# Patient Record
Sex: MALE | Race: WHITE | Employment: UNEMPLOYED | URBAN - METROPOLITAN AREA
[De-identification: names, ages, dates, MRNs, and addresses within clinical notes are randomized per-mention and may not be internally consistent; named-entity substitution may affect disease eponyms.]

---

## 2021-07-09 ENCOUNTER — APPOINTMENT (OUTPATIENT)
Dept: GENERAL RADIOLOGY | Age: 36
End: 2021-07-09

## 2021-07-09 LAB
ALBUMIN SERPL-MCNC: 4.4 G/DL (ref 3.5–5.2)
ALP BLD-CCNC: 40 U/L (ref 40–129)
ALT SERPL-CCNC: 17 U/L (ref 0–40)
ANION GAP SERPL CALCULATED.3IONS-SCNC: 9 MMOL/L (ref 7–16)
AST SERPL-CCNC: 20 U/L (ref 0–39)
BASOPHILS ABSOLUTE: 0.12 E9/L (ref 0–0.2)
BASOPHILS RELATIVE PERCENT: 1.3 % (ref 0–2)
BILIRUB SERPL-MCNC: 0.5 MG/DL (ref 0–1.2)
BUN BLDV-MCNC: 16 MG/DL (ref 6–20)
CALCIUM SERPL-MCNC: 8.9 MG/DL (ref 8.6–10.2)
CHLORIDE BLD-SCNC: 105 MMOL/L (ref 98–107)
CO2: 24 MMOL/L (ref 22–29)
CREAT SERPL-MCNC: 0.9 MG/DL (ref 0.7–1.2)
EOSINOPHILS ABSOLUTE: 0.22 E9/L (ref 0.05–0.5)
EOSINOPHILS RELATIVE PERCENT: 2.4 % (ref 0–6)
GFR AFRICAN AMERICAN: >60
GFR NON-AFRICAN AMERICAN: >60 ML/MIN/1.73
GLUCOSE BLD-MCNC: 107 MG/DL (ref 74–99)
HCT VFR BLD CALC: 46.1 % (ref 37–54)
HEMOGLOBIN: 15.1 G/DL (ref 12.5–16.5)
IMMATURE GRANULOCYTES #: 0.04 E9/L
IMMATURE GRANULOCYTES %: 0.4 % (ref 0–5)
LYMPHOCYTES ABSOLUTE: 2.14 E9/L (ref 1.5–4)
LYMPHOCYTES RELATIVE PERCENT: 23.4 % (ref 20–42)
MCH RBC QN AUTO: 28.8 PG (ref 26–35)
MCHC RBC AUTO-ENTMCNC: 32.8 % (ref 32–34.5)
MCV RBC AUTO: 87.8 FL (ref 80–99.9)
MONOCYTES ABSOLUTE: 0.98 E9/L (ref 0.1–0.95)
MONOCYTES RELATIVE PERCENT: 10.7 % (ref 2–12)
NEUTROPHILS ABSOLUTE: 5.63 E9/L (ref 1.8–7.3)
NEUTROPHILS RELATIVE PERCENT: 61.8 % (ref 43–80)
PDW BLD-RTO: 13.4 FL (ref 11.5–15)
PLATELET # BLD: 272 E9/L (ref 130–450)
PMV BLD AUTO: 10.8 FL (ref 7–12)
POTASSIUM SERPL-SCNC: 3.9 MMOL/L (ref 3.5–5)
RBC # BLD: 5.25 E12/L (ref 3.8–5.8)
SODIUM BLD-SCNC: 138 MMOL/L (ref 132–146)
TOTAL PROTEIN: 6.9 G/DL (ref 6.4–8.3)
TROPONIN, HIGH SENSITIVITY: <6 NG/L (ref 0–11)
WBC # BLD: 9.1 E9/L (ref 4.5–11.5)

## 2021-07-09 PROCEDURE — 80053 COMPREHEN METABOLIC PANEL: CPT

## 2021-07-09 PROCEDURE — 85025 COMPLETE CBC W/AUTO DIFF WBC: CPT

## 2021-07-09 PROCEDURE — 71045 X-RAY EXAM CHEST 1 VIEW: CPT

## 2021-07-09 PROCEDURE — 93005 ELECTROCARDIOGRAM TRACING: CPT | Performed by: NURSE PRACTITIONER

## 2021-07-09 PROCEDURE — 36415 COLL VENOUS BLD VENIPUNCTURE: CPT

## 2021-07-09 PROCEDURE — 84484 ASSAY OF TROPONIN QUANT: CPT

## 2021-07-09 ASSESSMENT — PAIN SCALES - GENERAL: PAINLEVEL_OUTOF10: 5

## 2021-07-10 ENCOUNTER — HOSPITAL ENCOUNTER (EMERGENCY)
Age: 36
Discharge: HOME | End: 2021-07-10
Attending: EMERGENCY MEDICINE
Payer: COMMERCIAL

## 2021-07-10 VITALS
TEMPERATURE: 98.7 F | OXYGEN SATURATION: 98 % | HEART RATE: 85 BPM | DIASTOLIC BLOOD PRESSURE: 78 MMHG | RESPIRATION RATE: 17 BRPM | SYSTOLIC BLOOD PRESSURE: 113 MMHG

## 2021-07-10 DIAGNOSIS — R07.9 CHEST PAIN, UNSPECIFIED TYPE: Primary | ICD-10-CM

## 2021-07-10 LAB
EKG ATRIAL RATE: 68 BPM
EKG P AXIS: 80 DEGREES
EKG P-R INTERVAL: 166 MS
EKG Q-T INTERVAL: 380 MS
EKG QRS DURATION: 78 MS
EKG QTC CALCULATION (BAZETT): 404 MS
EKG R AXIS: 93 DEGREES
EKG T AXIS: 62 DEGREES
EKG VENTRICULAR RATE: 68 BPM

## 2021-07-10 PROCEDURE — 93010 ELECTROCARDIOGRAM REPORT: CPT | Performed by: INTERNAL MEDICINE

## 2021-07-10 PROCEDURE — 6360000002 HC RX W HCPCS: Performed by: EMERGENCY MEDICINE

## 2021-07-10 PROCEDURE — 99283 EMERGENCY DEPT VISIT LOW MDM: CPT

## 2021-07-10 PROCEDURE — 96372 THER/PROPH/DIAG INJ SC/IM: CPT

## 2021-07-10 RX ORDER — KETOROLAC TROMETHAMINE 30 MG/ML
30 INJECTION, SOLUTION INTRAMUSCULAR; INTRAVENOUS ONCE
Status: COMPLETED | OUTPATIENT
Start: 2021-07-10 | End: 2021-07-10

## 2021-07-10 RX ADMIN — KETOROLAC TROMETHAMINE 30 MG: 30 INJECTION, SOLUTION INTRAMUSCULAR; INTRAVENOUS at 00:39

## 2021-07-10 NOTE — ED NOTES
Bed: 17A-17  Expected date:   Expected time:   Means of arrival:   Comments:  male     Harshil Duarte RN  07/10/21 0025

## 2021-07-10 NOTE — ED NOTES
FIRST PROVIDER CONTACT ASSESSMENT NOTE        Department of Emergency Medicine            ED  First Provider Note            7/9/21  9:50 PM EDT    Chief Complaint: Chest Pain (PT STATES HIS CHEST PAIN STARTING THIS MORNING. PT REPORTS LEFT ARM NUMBNESS)      History of Present Illness:    Monae Ceron is a 38 y.o. male who presents to the emergency department for Chest Pain (PT STATES HIS CHEST PAIN STARTING THIS MORNING. PT REPORTS LEFT ARM NUMBNESS)  Focused Screening Exam:  Constitutional:  Alert, appears stated age and is in no distress.     *ALLERGIES*     Benadryl [diphenhydramine hcl]     ED Triage Vitals   BP Temp Temp src Pulse Resp SpO2 Height Weight   07/09/21 2146 07/09/21 2135 -- 07/09/21 2135 07/09/21 2135 07/09/21 2135 -- --   119/70 98.7 °F (37.1 °C)  (!) 41 17 98 %          Initial Plan of Care:  Initiate Treatment-Testing, Proceed toTreatment Area When Bed Available for ED Attending/MLP to Continue Care    -----------------END OF FIRST PROVIDER CONTACT ASSESSMENT NOTE--------------  Electronically signed by NATANAEL Barr CNP   DD: 7/9/21         NATANAEL Barr CNP  07/09/21 7990

## 2021-07-10 NOTE — ED PROVIDER NOTES
Department of Emergency Medicine   ED  Provider Note  Admit Date/RoomTime: 7/10/2021 12:25 AM  [facility]          History of Present Illness:  7/10/21, Time: 12:32 AM EDT  Chief Complaint   Patient presents with    Chest Pain     PT STATES HIS CHEST PAIN STARTING THIS MORNING. PT REPORTS LEFT ARM NUMBNESS                Dylan Cowart is a 38 y.o. male presenting to the ED for chest pain. Patient has had left-sided chest pain intermittently throughout the morning. Nothing makes it better or worse, sharp sensation, does not rating her. He has not had this in the past.  Denies any cardiac risk factors. Denies any PE risk factors. Denies any fever, chills, nausea, vomiting, change in bowel or bladder, neck pain or stiffness, lethargy, back pain, or any other symptoms or complaints. Review of Systems:   Pertinent positives and negatives are stated within HPI, all other systems reviewed and are negative.        --------------------------------------------- PAST HISTORY ---------------------------------------------  Past Medical History:  has no past medical history on file. Past Surgical History:  has a past surgical history that includes hernia repair. Social History:  reports that he has been smoking cigarettes. He has been smoking about 2.00 packs per day. He has never used smokeless tobacco. He reports that he does not drink alcohol and does not use drugs. Family History: family history includes Asthma in his mother; Heart Disease in his father; Morna Look in his father. . Unless otherwise noted, family history is non contributory    The patients home medications have been reviewed.     Allergies: Benadryl [diphenhydramine hcl]        ---------------------------------------------------PHYSICAL EXAM--------------------------------------    Constitutional/General: Alert and oriented x3  Head: Normocephalic and atraumatic  Eyes: PERRL, EOMI, sclera non icteric  Mouth: Oropharynx clear, handling secretions, no trismus, no asymmetry of the posterior oropharynx or uvular edema  Neck: Supple, full ROM, no stridor, no meningeal signs  Respiratory: Lungs clear to auscultation bilaterally, no wheezes, rales, or rhonchi. Not in respiratory distress  Cardiovascular:  Regular rate. Regular rhythm. 2+ distal pulses. Equal extremity pulses. Chest: No chest wall tenderness  GI:  Abdomen Soft, Non tender, Non distended. No rebound, guarding, or rigidity. No pulsatile masses. Musculoskeletal: Moves all extremities x 4. Warm and well perfused, no clubbing, cyanosis, or edema. Capillary refill <3 seconds  Integument: skin warm and dry. No rashes. Neurologic: GCS 15, no focal deficits, symmetric strength 5/5 in the upper and lower extremities bilaterally  Psychiatric: Normal Affect          -------------------------------------------------- RESULTS -------------------------------------------------  I have personally reviewed all laboratory and imaging results for this patient. Results are listed below.      LABS: (Lab results interpreted by me)  Results for orders placed or performed during the hospital encounter of 07/10/21   CBC Auto Differential   Result Value Ref Range    WBC 9.1 4.5 - 11.5 E9/L    RBC 5.25 3.80 - 5.80 E12/L    Hemoglobin 15.1 12.5 - 16.5 g/dL    Hematocrit 46.1 37.0 - 54.0 %    MCV 87.8 80.0 - 99.9 fL    MCH 28.8 26.0 - 35.0 pg    MCHC 32.8 32.0 - 34.5 %    RDW 13.4 11.5 - 15.0 fL    Platelets 205 995 - 198 E9/L    MPV 10.8 7.0 - 12.0 fL    Neutrophils % 61.8 43.0 - 80.0 %    Immature Granulocytes % 0.4 0.0 - 5.0 %    Lymphocytes % 23.4 20.0 - 42.0 %    Monocytes % 10.7 2.0 - 12.0 %    Eosinophils % 2.4 0.0 - 6.0 %    Basophils % 1.3 0.0 - 2.0 %    Neutrophils Absolute 5.63 1.80 - 7.30 E9/L    Immature Granulocytes # 0.04 E9/L    Lymphocytes Absolute 2.14 1.50 - 4.00 E9/L    Monocytes Absolute 0.98 (H) 0.10 - 0.95 E9/L    Eosinophils Absolute 0.22 0.05 - 0.50 E9/L    Basophils Absolute 0. 12 0.00 - 0.20 E9/L   Comprehensive Metabolic Panel   Result Value Ref Range    Sodium 138 132 - 146 mmol/L    Potassium 3.9 3.5 - 5.0 mmol/L    Chloride 105 98 - 107 mmol/L    CO2 24 22 - 29 mmol/L    Anion Gap 9 7 - 16 mmol/L    Glucose 107 (H) 74 - 99 mg/dL    BUN 16 6 - 20 mg/dL    CREATININE 0.9 0.7 - 1.2 mg/dL    GFR Non-African American >60 >=60 mL/min/1.73    GFR African American >60     Calcium 8.9 8.6 - 10.2 mg/dL    Total Protein 6.9 6.4 - 8.3 g/dL    Albumin 4.4 3.5 - 5.2 g/dL    Total Bilirubin 0.5 0.0 - 1.2 mg/dL    Alkaline Phosphatase 40 40 - 129 U/L    ALT 17 0 - 40 U/L    AST 20 0 - 39 U/L   Troponin   Result Value Ref Range    Troponin, High Sensitivity <6 0 - 11 ng/L   ,       RADIOLOGY:  Interpreted by Radiologist unless otherwise specified  XR CHEST PORTABLE   Final Result   No acute cardiopulmonary process. EKG Interpretation  Interpreted by emergency department physician, Dr. Ze Suggs, rate 68, no STEMI        ------------------------- NURSING NOTES AND VITALS REVIEWED ---------------------------   The nursing notes within the ED encounter and vital signs as below have been reviewed by myself  /70   Pulse 83   Temp 98.7 °F (37.1 °C)   Resp 17   SpO2 98%     Oxygen Saturation Interpretation: Normal    The patients available past medical records and past encounters were reviewed. ------------------------------ ED COURSE/MEDICAL DECISION MAKING----------------------  Medications - No data to display        The cardiac monitor revealed sinus with a heart rate in the 80s as interpreted by me. The cardiac monitor was ordered secondary to the patient's chest pain and to monitor the patient for dysrhythmia. CPT 31269         Medical Decision Making:    Labs and imaging reviewed. Patient's heart score is 0. PERC score is 0. He has chest pain for greater than 6 hours with the a high sensitive troponin less than 6.   Given his low risk factors, did not feel that further emergent valuation was indicated. Patient be discharged instructed to follow-up with his PCP, he is educated on signs and symptoms that require emergent evaluation. Counseling: The emergency provider has spoken with the patient and discussed todays results, in addition to providing specific details for the plan of care and counseling regarding the diagnosis and prognosis. Questions are answered at this time and they are agreeable with the plan.       --------------------------------- IMPRESSION AND DISPOSITION ---------------------------------    IMPRESSION  1. Chest pain, unspecified type        DISPOSITION  Disposition: Discharge to home  Patient condition is stable        NOTE: This report was transcribed using voice recognition software.  Every effort was made to ensure accuracy; however, inadvertent computerized transcription errors may be present        Kathy Donis MD  07/10/21 2404

## 2021-08-04 ENCOUNTER — APPOINTMENT (OUTPATIENT)
Dept: GENERAL RADIOLOGY | Age: 36
End: 2021-08-04
Payer: COMMERCIAL

## 2021-08-04 ENCOUNTER — HOSPITAL ENCOUNTER (EMERGENCY)
Age: 36
Discharge: HOME | End: 2021-08-04
Payer: COMMERCIAL

## 2021-08-04 VITALS
BODY MASS INDEX: 25.9 KG/M2 | HEART RATE: 100 BPM | DIASTOLIC BLOOD PRESSURE: 89 MMHG | SYSTOLIC BLOOD PRESSURE: 117 MMHG | OXYGEN SATURATION: 96 % | RESPIRATION RATE: 15 BRPM | WEIGHT: 185 LBS | HEIGHT: 71 IN | TEMPERATURE: 97.5 F

## 2021-08-04 DIAGNOSIS — M79.641 PAIN IN BOTH HANDS: Primary | ICD-10-CM

## 2021-08-04 DIAGNOSIS — M79.642 PAIN IN BOTH HANDS: Primary | ICD-10-CM

## 2021-08-04 PROCEDURE — 99283 EMERGENCY DEPT VISIT LOW MDM: CPT

## 2021-08-04 PROCEDURE — 73130 X-RAY EXAM OF HAND: CPT

## 2021-08-04 PROCEDURE — 6360000002 HC RX W HCPCS: Performed by: PHYSICIAN ASSISTANT

## 2021-08-04 PROCEDURE — 96372 THER/PROPH/DIAG INJ SC/IM: CPT

## 2021-08-04 RX ORDER — NAPROXEN 500 MG/1
500 TABLET ORAL 2 TIMES DAILY WITH MEALS
Qty: 20 TABLET | Refills: 0 | Status: SHIPPED | OUTPATIENT
Start: 2021-08-04 | End: 2021-11-16

## 2021-08-04 RX ORDER — METHYLPREDNISOLONE 4 MG/1
TABLET ORAL
Qty: 1 KIT | Refills: 0 | Status: SHIPPED | OUTPATIENT
Start: 2021-08-04 | End: 2021-08-10

## 2021-08-04 RX ORDER — KETOROLAC TROMETHAMINE 30 MG/ML
30 INJECTION, SOLUTION INTRAMUSCULAR; INTRAVENOUS ONCE
Status: COMPLETED | OUTPATIENT
Start: 2021-08-04 | End: 2021-08-04

## 2021-08-04 RX ADMIN — KETOROLAC TROMETHAMINE 30 MG: 30 INJECTION, SOLUTION INTRAMUSCULAR; INTRAVENOUS at 11:30

## 2021-08-04 ASSESSMENT — PAIN DESCRIPTION - ORIENTATION: ORIENTATION: RIGHT;LEFT

## 2021-08-04 ASSESSMENT — PAIN SCALES - GENERAL
PAINLEVEL_OUTOF10: 9
PAINLEVEL_OUTOF10: 10

## 2021-08-04 ASSESSMENT — PAIN DESCRIPTION - PAIN TYPE: TYPE: ACUTE PAIN

## 2021-08-04 ASSESSMENT — PAIN DESCRIPTION - LOCATION: LOCATION: HAND

## 2021-08-04 NOTE — ED PROVIDER NOTES
[facility]    HPI:  8/4/21, Time: 11:23 AM EDT         Fanny Hunt is a 38 y.o. male presenting to the ED for bilateral hand pain, beginning years ago however, has been worse the past 3 months. Patient denies any particular injury but does relay a history of arthritis. The complaint has been persistent, moderate in severity, and worsened by movement of joints of bilateral hands. Patient has been afebrile without recent travel or sick contacts. Patient denies other symptoms at this time. Review of Systems:   A complete review of systems was performed and pertinent positives and negatives are stated within HPI, all other systems reviewed and are negative.          --------------------------------------------- PAST HISTORY ---------------------------------------------  Past Medical History:  has no past medical history on file. Past Surgical History:  has a past surgical history that includes hernia repair. Social History:  reports that he has been smoking cigarettes. He has been smoking about 2.00 packs per day. He has never used smokeless tobacco. He reports that he does not drink alcohol and does not use drugs. Family History: family history includes Asthma in his mother; Heart Disease in his father; Guthrie Rattler in his father. The patients home medications have been reviewed. Allergies: Benadryl [diphenhydramine hcl] and Other    -------------------------------------------------- RESULTS -------------------------------------------------  All laboratory and radiology results have been personally reviewed by myself   LABS:  No results found for this visit on 08/04/21. RADIOLOGY:  Interpreted by Radiologist.  XR HAND RIGHT (MIN 3 VIEWS)   Final Result   No acute fractures or dislocations in the right hand. XR HAND LEFT (MIN 3 VIEWS)   Final Result   Focal density at the base of 4th proximal phalanx may represent avulsion   injury versus remote injury.       No significant edema of left hand. Please correlate with physical exam.             ------------------------- NURSING NOTES AND VITALS REVIEWED ---------------------------   The nursing notes within the ED encounter and vital signs as below have been reviewed. /89   Pulse 100   Temp 97.5 °F (36.4 °C)   Resp 15   Ht 5' 11\" (1.803 m)   Wt 185 lb (83.9 kg)   SpO2 96%   BMI 25.80 kg/m²   Oxygen Saturation Interpretation: Normal      ---------------------------------------------------PHYSICAL EXAM--------------------------------------      Constitutional/General: Alert and oriented x3, well appearing, non toxic in NAD  Head: Normocephalic and atraumatic  Eyes: PERRL, EOMI  Mouth: Oropharynx clear, handling secretions, no trismus  Neck: Supple, full ROM,   Pulmonary: Lungs clear to auscultation bilaterally, no wheezes, rales, or rhonchi. Not in respiratory distress  Cardiovascular:  Regular rate and rhythm, no murmurs, gallops, or rubs. 2+ distal pulses  Abdomen: Soft, non tender, non distended,   Extremities: Moves all extremities x 4. Warm and well perfused  Skin: warm and dry without rash  Neurologic: GCS 15,  Psych: Normal Affect      ------------------------------ ED COURSE/MEDICAL DECISION MAKING----------------------  Medications   ketorolac (TORADOL) injection 30 mg (30 mg Intramuscular Given 8/4/21 1130)         ED COURSE:  ED Course as of Aug 04 1700   Wed Aug 04, 2021   1220 Reassessed patient. Remains stable and neurovascularly intact. Awaiting XR. [MS]   1311 Awaiting XR. Patient taken to radiology. [MS]   1326 Reassessed patient. Remained stable and neurovascularly intact. Discussed imaging results with the patient. No acute pathology noted on x-rays done in the emergency department today. He is nontoxic-appearing, afebrile, no acute distress. Vitals are stable. At this time recommended outpatient symptom management.   Advised follow-up with PCP for recheck and return to the emergency department with any new or worsening symptoms. Patient voiced understanding and is agreeable to the above treatment plan. [MS]      ED Course User Index  [MS] Prisca Grider       Medical Decision Making:    See ED course above. Counseling: The emergency provider has spoken with the patient and discussed todays results, in addition to providing specific details for the plan of care and counseling regarding the diagnosis and prognosis. Questions are answered at this time and they are agreeable with the plan.      --------------------------------- IMPRESSION AND DISPOSITION ---------------------------------    IMPRESSION  1. Pain in both hands        DISPOSITION  Disposition: Discharge to home  Patient condition is stable      NOTE: This report was transcribed using voice recognition software.  Every effort was made to ensure accuracy; however, inadvertent computerized transcription errors may be present        Prisca Grider  08/04/21 1776

## 2021-08-06 ENCOUNTER — HOSPITAL ENCOUNTER (EMERGENCY)
Age: 36
Discharge: LEFT BEFORE DISCHARGE | End: 2021-08-06
Payer: COMMERCIAL

## 2021-08-06 VITALS — OXYGEN SATURATION: 98 % | TEMPERATURE: 98 F | HEART RATE: 90 BPM

## 2021-08-15 ENCOUNTER — HOSPITAL ENCOUNTER (EMERGENCY)
Age: 36
Discharge: HOME | End: 2021-08-16
Attending: EMERGENCY MEDICINE
Payer: COMMERCIAL

## 2021-08-15 DIAGNOSIS — T50.901A ACCIDENTAL DRUG OVERDOSE, INITIAL ENCOUNTER: Primary | ICD-10-CM

## 2021-08-15 PROCEDURE — 93005 ELECTROCARDIOGRAM TRACING: CPT | Performed by: EMERGENCY MEDICINE

## 2021-08-15 PROCEDURE — 99284 EMERGENCY DEPT VISIT MOD MDM: CPT

## 2021-08-15 RX ORDER — ACETAMINOPHEN 500 MG
TABLET ORAL
Status: COMPLETED
Start: 2021-08-15 | End: 2021-08-16

## 2021-08-16 ENCOUNTER — APPOINTMENT (OUTPATIENT)
Dept: CT IMAGING | Age: 36
End: 2021-08-16
Payer: COMMERCIAL

## 2021-08-16 VITALS
OXYGEN SATURATION: 97 % | RESPIRATION RATE: 18 BRPM | TEMPERATURE: 96.9 F | HEIGHT: 72 IN | SYSTOLIC BLOOD PRESSURE: 129 MMHG | HEART RATE: 84 BPM | DIASTOLIC BLOOD PRESSURE: 83 MMHG | WEIGHT: 180 LBS | BODY MASS INDEX: 24.38 KG/M2

## 2021-08-16 LAB
EKG ATRIAL RATE: 85 BPM
EKG P AXIS: 77 DEGREES
EKG P-R INTERVAL: 174 MS
EKG Q-T INTERVAL: 362 MS
EKG QRS DURATION: 74 MS
EKG QTC CALCULATION (BAZETT): 430 MS
EKG R AXIS: 82 DEGREES
EKG T AXIS: 61 DEGREES
EKG VENTRICULAR RATE: 85 BPM

## 2021-08-16 PROCEDURE — 72125 CT NECK SPINE W/O DYE: CPT

## 2021-08-16 PROCEDURE — 6370000000 HC RX 637 (ALT 250 FOR IP)

## 2021-08-16 PROCEDURE — 70450 CT HEAD/BRAIN W/O DYE: CPT

## 2021-08-16 PROCEDURE — 93010 ELECTROCARDIOGRAM REPORT: CPT | Performed by: INTERNAL MEDICINE

## 2021-08-16 RX ORDER — ACETAMINOPHEN 500 MG
1000 TABLET ORAL ONCE
Status: COMPLETED | OUTPATIENT
Start: 2021-08-16 | End: 2021-08-16

## 2021-08-16 RX ADMIN — ACETAMINOPHEN 1000 MG: 500 TABLET ORAL at 00:00

## 2021-08-16 RX ADMIN — Medication 1000 MG: at 00:00

## 2021-08-16 ASSESSMENT — PAIN SCALES - GENERAL: PAINLEVEL_OUTOF10: 10

## 2021-08-16 NOTE — ED PROVIDER NOTES
Department of Emergency Medicine   ED  Provider Note  Admit Date/RoomTime: 8/15/2021 10:51 PM  ED Room: 10/10          History of Present Illness:  8/15/21, Time: 11:04 PM EDT  Chief Complaint   Patient presents with    Drug Overdose     Found laying on [facility] at [facility]. PD gave Narcan 4mg, pt became responsive later became combative with PD. Pt states \"I didn't take anything\"  Pink slip by [employer], denies SI or HI                Mauro Wylie is a 38 y.o. male presenting to the ED for possible drug overdose, beginning less than 1 hour ago. Patient is currently at a secure skilled nursing [place], reportedly was found unresponsive, given Narcan, then awoke and became aggressive. Patient was then brought to the ED for evaluation. Patient denies any drug use, states \"I was just sleeping and then everyone was in my room. \"  He is refusing to answer any questions, states that he does have a headache. Review of Systems:   Pertinent positives and negatives are stated within HPI, all other systems reviewed and are negative.        --------------------------------------------- PAST HISTORY ---------------------------------------------  Past Medical History:  has no past medical history on file. Past Surgical History:  has a past surgical history that includes hernia repair. Social History:  reports that he has been smoking cigarettes. He has been smoking about 2.00 packs per day. He has never used smokeless tobacco. He reports that he does not drink alcohol and does not use drugs. Family History: family history includes Asthma in his mother; Heart Disease in his father; Alda Trixie in his father. . Unless otherwise noted, family history is non contributory    The patients home medications have been reviewed.     Allergies: Benadryl [diphenhydramine hcl] and Other        ---------------------------------------------------PHYSICAL EXAM--------------------------------------    Constitutional/General: Alert and oriented x3  Head: Normocephalic, abrasion to the left brow  Eyes: PERRL, EOMI, sclera non icteric  Mouth: Oropharynx clear, handling secretions, no trismus, no asymmetry of the posterior oropharynx or uvular edema  Neck: Supple, full ROM, no stridor, no meningeal signs  Respiratory: Lungs clear to auscultation bilaterally, no wheezes, rales, or rhonchi. Not in respiratory distress  Cardiovascular:  Regular rate. Regular rhythm. No murmurs, no aortic murmurs, no gallops, or rubs. 2+ distal pulses. Equal extremity pulses. Chest: No chest wall tenderness  GI:  Abdomen Soft, Non tender, Non distended. No rebound, guarding, or rigidity. No pulsatile masses. Musculoskeletal: Moves all extremities x 4. Warm and well perfused, no clubbing, cyanosis, or edema. Capillary refill <3 seconds  Integument: skin warm and dry. No rashes. Neurologic: GCS 15, no focal deficits, symmetric strength 5/5 in the upper and lower extremities bilaterally  Psychiatric: Normal Affect          -------------------------------------------------- RESULTS -------------------------------------------------  I have personally reviewed all laboratory and imaging results for this patient. Results are listed below. LABS: (Lab results interpreted by me)  No results found for this visit on 08/15/21.,       RADIOLOGY:  Interpreted by Radiologist unless otherwise specified  CT Head WO Contrast   Final Result   Small left frontal scalp hematoma with no associated fractures or evidence of   acute intracranial trauma. CT Cervical Spine WO Contrast   Final Result   No acute abnormality of the cervical spine.                EKG Interpretation  Interpreted by emergency department physician, Dr. Maribel Rose    Date of EK/15/21  Time:     Rhythm: normal sinus   Rate: 85  Axis: normal  Conduction: normal  ST Segments: no acute change  T Waves: no acute change    Clinical Impression: No findings suggestive of acute ischemia or injury  Comparison to prior EKG: stable as compared to patient's most recent EKG      ------------------------- NURSING NOTES AND VITALS REVIEWED ---------------------------   The nursing notes within the ED encounter and vital signs as below have been reviewed by myself  /83   Pulse 87   Temp 96.9 °F (36.1 °C) (Temporal)   Resp 16   Ht 6' (1.829 m)   Wt 180 lb (81.6 kg)   SpO2 96%   BMI 24.41 kg/m²     Oxygen Saturation Interpretation: Normal    The patients available past medical records and past encounters were reviewed. ------------------------------ ED COURSE/MEDICAL DECISION MAKING----------------------  Medications   acetaminophen (TYLENOL) tablet 1,000 mg (1,000 mg Oral Given 8/16/21 0000)           The cardiac monitor revealed sinus rhythm with a heart rate in the 80s as interpreted by me. The cardiac monitor was ordered secondary to the patient's chest pain and to monitor for patient for dysrhythmia. CPT 70943           Medical Decision Making:     I, Dr. Mary Enriquez, am the primary provider of record    49-year-old male residing in a secure [facility] presenting with possible drug overdose. He was given Narcan with return of consciousness. Patient became aggressive and was handcuffed. Pink slip was initiated by ÁNGELA HAYWARD. Here he is more cooperative but still refusing to answer any questions. He has an abrasion to his left brow. EKG shows no suggestion of ischemia or injury, no conduction delay, unchanged from previous. CT head and cervical spine show no acute abnormality. Patient is awake and alert, staff of the residential house is at bedside, will be discharged and brought back to the secure [facility]. PCP follow-up as needed with instruction to return for any changes in condition or new symptoms. Re-Evaluations:   This patient's ED course included: a personal history and physicial examination and re-evaluation prior to disposition    This patient has remained hemodynamically stable and been closely monitored during their ED course. Counseling: The emergency provider has spoken with the patient and assisted house staff and discussed todays results, in addition to providing specific details for the plan of care and counseling regarding the diagnosis and prognosis. Questions are answered at this time and they are agreeable with the plan.       --------------------------------- IMPRESSION AND DISPOSITION ---------------------------------    IMPRESSION  1. Accidental drug overdose, initial encounter        DISPOSITION  Disposition: Discharge to [facility]  Patient condition is stable        NOTE: This report was transcribed using voice recognition software.  Every effort was made to ensure accuracy; however, inadvertent computerized transcription errors may be present        Neeraj Dodge DO  08/16/21 0104

## 2021-11-16 ENCOUNTER — HOSPITAL ENCOUNTER (EMERGENCY)
Age: 36
Discharge: LEFT BEFORE DISCHARGE | End: 2021-11-16
Payer: COMMERCIAL

## 2021-11-16 ENCOUNTER — NURSE TRIAGE (OUTPATIENT)
Dept: OTHER | Facility: CLINIC | Age: 36
End: 2021-11-16

## 2021-11-16 VITALS
SYSTOLIC BLOOD PRESSURE: 114 MMHG | TEMPERATURE: 98.2 F | DIASTOLIC BLOOD PRESSURE: 88 MMHG | BODY MASS INDEX: 22.4 KG/M2 | RESPIRATION RATE: 16 BRPM | WEIGHT: 160 LBS | HEIGHT: 71 IN | OXYGEN SATURATION: 100 % | HEART RATE: 96 BPM

## 2021-11-16 LAB
ALBUMIN SERPL-MCNC: 4.9 G/DL (ref 3.5–5.2)
ALP BLD-CCNC: 56 U/L (ref 40–129)
ALT SERPL-CCNC: 34 U/L (ref 0–40)
ANION GAP SERPL CALCULATED.3IONS-SCNC: 11 MMOL/L (ref 7–16)
AST SERPL-CCNC: 18 U/L (ref 0–39)
BACTERIA: NORMAL /HPF
BASOPHILS ABSOLUTE: 0.15 E9/L (ref 0–0.2)
BASOPHILS RELATIVE PERCENT: 2 % (ref 0–2)
BILIRUB SERPL-MCNC: 0.7 MG/DL (ref 0–1.2)
BILIRUBIN URINE: NEGATIVE
BLOOD, URINE: NEGATIVE
BUN BLDV-MCNC: 15 MG/DL (ref 6–20)
CALCIUM SERPL-MCNC: 10.1 MG/DL (ref 8.6–10.2)
CHLORIDE BLD-SCNC: 106 MMOL/L (ref 98–107)
CLARITY: CLEAR
CO2: 25 MMOL/L (ref 22–29)
COLOR: YELLOW
CREAT SERPL-MCNC: 1 MG/DL (ref 0.7–1.2)
EOSINOPHILS ABSOLUTE: 0.34 E9/L (ref 0.05–0.5)
EOSINOPHILS RELATIVE PERCENT: 4.5 % (ref 0–6)
GFR AFRICAN AMERICAN: >60
GFR NON-AFRICAN AMERICAN: >60 ML/MIN/1.73
GLUCOSE BLD-MCNC: 107 MG/DL (ref 74–99)
GLUCOSE URINE: NEGATIVE MG/DL
HCT VFR BLD CALC: 50.7 % (ref 37–54)
HEMOGLOBIN: 16.9 G/DL (ref 12.5–16.5)
IMMATURE GRANULOCYTES #: 0.04 E9/L
IMMATURE GRANULOCYTES %: 0.5 % (ref 0–5)
KETONES, URINE: NEGATIVE MG/DL
LACTIC ACID: 0.9 MMOL/L (ref 0.5–2.2)
LEUKOCYTE ESTERASE, URINE: NEGATIVE
LIPASE: 60 U/L (ref 13–60)
LYMPHOCYTES ABSOLUTE: 2.21 E9/L (ref 1.5–4)
LYMPHOCYTES RELATIVE PERCENT: 29.5 % (ref 20–42)
MCH RBC QN AUTO: 28.9 PG (ref 26–35)
MCHC RBC AUTO-ENTMCNC: 33.3 % (ref 32–34.5)
MCV RBC AUTO: 86.7 FL (ref 80–99.9)
MONOCYTES ABSOLUTE: 0.65 E9/L (ref 0.1–0.95)
MONOCYTES RELATIVE PERCENT: 8.7 % (ref 2–12)
NEUTROPHILS ABSOLUTE: 4.1 E9/L (ref 1.8–7.3)
NEUTROPHILS RELATIVE PERCENT: 54.8 % (ref 43–80)
NITRITE, URINE: NEGATIVE
PDW BLD-RTO: 13 FL (ref 11.5–15)
PH UA: 5.5 (ref 5–9)
PLATELET # BLD: 290 E9/L (ref 130–450)
PMV BLD AUTO: 10.9 FL (ref 7–12)
POTASSIUM SERPL-SCNC: 5 MMOL/L (ref 3.5–5)
PROTEIN UA: NEGATIVE MG/DL
RBC # BLD: 5.85 E12/L (ref 3.8–5.8)
RBC UA: NORMAL /HPF (ref 0–2)
SODIUM BLD-SCNC: 142 MMOL/L (ref 132–146)
SPECIFIC GRAVITY UA: 1.02 (ref 1–1.03)
TOTAL PROTEIN: 8.1 G/DL (ref 6.4–8.3)
UROBILINOGEN, URINE: 0.2 E.U./DL
WBC # BLD: 7.5 E9/L (ref 4.5–11.5)
WBC UA: NORMAL /HPF (ref 0–5)

## 2021-11-16 PROCEDURE — 83690 ASSAY OF LIPASE: CPT

## 2021-11-16 PROCEDURE — 83605 ASSAY OF LACTIC ACID: CPT

## 2021-11-16 PROCEDURE — 80053 COMPREHEN METABOLIC PANEL: CPT

## 2021-11-16 PROCEDURE — 81001 URINALYSIS AUTO W/SCOPE: CPT

## 2021-11-16 PROCEDURE — 85025 COMPLETE CBC W/AUTO DIFF WBC: CPT

## 2021-11-16 PROCEDURE — 4500000002 HC ER NO CHARGE

## 2021-11-16 RX ORDER — OMEPRAZOLE 20 MG/1
20 CAPSULE, DELAYED RELEASE ORAL DAILY
COMMUNITY
End: 2021-11-18 | Stop reason: ALTCHOICE

## 2021-11-16 ASSESSMENT — PAIN DESCRIPTION - LOCATION: LOCATION: ABDOMEN

## 2021-11-16 ASSESSMENT — PAIN SCALES - GENERAL: PAINLEVEL_OUTOF10: 10

## 2021-11-16 NOTE — ED NOTES
Patient left without being seen said he has waited too long patient eloped     Roseanna Fraga, RN  11/16/21 8408

## 2021-11-16 NOTE — ED NOTES
FIRST PROVIDER CONTACT ASSESSMENT NOTE           Department of Emergency Medicine                 First Provider Note            21  11:23 AM EST    Date of Encounter: No admission date for patient encounter. Patient Name: Kwabena Hathaway  : 1985  MRN: [id number]    Chief Complaint: No chief complaint on file. History of Present Illness:   Kwabean Hathaway is a 43 y.o. male who presents to the ED for. Patient states he was recently treated for an ulcer and was incarcerated and sent home without any antibiotics and is in very uncomfortable pain. States he is having some diarrhea. Patient has a history of H. pylori with a gastric perforation. Patient is supposed to see Tj Rodgers shortly but has not been able to get in yet. Focused Physical Exam:  VS:    ED Triage Vitals [21 1057]   BP Temp Temp src Pulse Resp SpO2 Height Weight   -- 97.4 °F (36.3 °C) -- 110 -- 97 % 5' 11\" (1.803 m) 160 lb (72.6 kg)        Physical Ex: Constitutional: Alert and non-toxic. Medical History:  has no past medical history on file. Surgical History:  has a past surgical history that includes hernia repair. Social History:  reports that he has been smoking cigarettes. He has been smoking about 2.00 packs per day. He has never used smokeless tobacco. He reports that he does not drink alcohol and does not use drugs. Family History: family history includes Asthma in his mother; Heart Disease in his father; Marva Black in his father.     Allergies: Benadryl [diphenhydramine hcl] and Other     Initial Plan of Care: Initiate Treatment-Testing, Proceed toTreatment Area When Bed Available for ED Attending/MLP to Continue Care      ---END OF FIRST PROVIDER CONTACT ASSESSMENT NOTE---  Electronically signed by Thee Camacho PA-C   DD: 21       Thee Camacho PA-C  21 1123

## 2021-11-16 NOTE — TELEPHONE ENCOUNTER
Caller disconnected with [facility] prior to being transferred to Nurse Triage. Triage RN attempted to leave a voicemail but the caller's voicemail box is not set up.     Reason for Disposition   Unable to complete triage due to phone connection issues    Protocols used: NO CONTACT OR DUPLICATE CONTACT CALL-ADULT-[place]

## 2021-11-18 ENCOUNTER — HOSPITAL ENCOUNTER (EMERGENCY)
Age: 36
Discharge: HOME | End: 2021-11-18
Attending: FAMILY MEDICINE
Payer: COMMERCIAL

## 2021-11-18 VITALS
BODY MASS INDEX: 22.4 KG/M2 | HEART RATE: 94 BPM | TEMPERATURE: 97.9 F | DIASTOLIC BLOOD PRESSURE: 69 MMHG | OXYGEN SATURATION: 99 % | HEIGHT: 71 IN | WEIGHT: 160 LBS | RESPIRATION RATE: 18 BRPM | SYSTOLIC BLOOD PRESSURE: 121 MMHG

## 2021-11-18 DIAGNOSIS — R10.13 ABDOMINAL PAIN, EPIGASTRIC: Primary | ICD-10-CM

## 2021-11-18 PROCEDURE — 99283 EMERGENCY DEPT VISIT LOW MDM: CPT

## 2021-11-18 PROCEDURE — 6370000000 HC RX 637 (ALT 250 FOR IP): Performed by: FAMILY MEDICINE

## 2021-11-18 RX ORDER — SUCRALFATE 1 G/1
1 TABLET ORAL 4 TIMES DAILY
Qty: 120 TABLET | Refills: 0 | Status: SHIPPED | OUTPATIENT
Start: 2021-11-18

## 2021-11-18 RX ORDER — PANTOPRAZOLE SODIUM 40 MG/1
40 TABLET, DELAYED RELEASE ORAL ONCE
Status: COMPLETED | OUTPATIENT
Start: 2021-11-18 | End: 2021-11-18

## 2021-11-18 RX ORDER — OMEPRAZOLE 40 MG/1
40 CAPSULE, DELAYED RELEASE ORAL
Qty: 30 CAPSULE | Refills: 0 | Status: SHIPPED | OUTPATIENT
Start: 2021-11-18

## 2021-11-18 RX ADMIN — PANTOPRAZOLE SODIUM 40 MG: 40 TABLET, DELAYED RELEASE ORAL at 10:46

## 2021-11-18 ASSESSMENT — PAIN DESCRIPTION - PAIN TYPE: TYPE: ACUTE PAIN

## 2021-11-18 ASSESSMENT — PAIN DESCRIPTION - LOCATION: LOCATION: ABDOMEN

## 2021-11-18 ASSESSMENT — PAIN DESCRIPTION - ORIENTATION: ORIENTATION: MID;UPPER

## 2021-11-18 ASSESSMENT — PAIN DESCRIPTION - DESCRIPTORS: DESCRIPTORS: ACHING

## 2021-11-18 ASSESSMENT — PAIN SCALES - GENERAL: PAINLEVEL_OUTOF10: 7

## 2021-11-18 NOTE — ED NOTES
INITIAL CONTACT WITH PT   ALERT AND ORIENTED TIMES 4. SKIN WARM AND DRY. RESPIRATIONS EVEN AND UNLABORED. LS CLEAR. GRASPS AND PUMPS EQUAL AND STRONG. STATES THAT HE NEEDS MEDICATION SCRIPTS TO GET HIM BY TILL HE CAN GET INTO THE DR. STATES THAT HE WENT TO THE CLINIC AS SCHEDULED BUT THE WRONG CLINIC STATES THAT HE TOOK THE BUS TO [facility]. STATES THAT HE HAS BEEN WORKING ON GETTING HIS MEDICAL CARD AND STATES THAT HE JUST NEEDS SCRIPTS. NO DISTRRESS NOTED.         Marcus Ruvalcaba RN  11/18/21 5168

## 2021-11-18 NOTE — ED PROVIDER NOTES
HPI:  11/18/21,   Time: 10:36 AM EULALIA Harper is a 43 y.o. male presenting to the ED for epigastric pain patient released from confinement during which time he was treated for H pylori as well as surgery for perforation gastric ulcer and pneumoperitoneum denies any nausea vomiting or diarrhea no chest pain no shortness of breath is unable to obtain his follow-up medications due to finances. .  The complaint has been persistent, moderate in severity, and worsened by nothing. ROS:   Pertinent positives and negatives are stated within HPI, all other systems reviewed and are negative.  --------------------------------------------- PAST HISTORY ---------------------------------------------  Past Medical History:  has a past medical history of Acute gastric ulcer with perforation ([facility] [address] Dermatophytosis, Helicobacter pylori infection, Plantar fasciitis, and Pneumoperitoneum. Past Surgical History:  has a past surgical history that includes hernia repair and Abdomen surgery. Social History:  reports that he has been smoking cigarettes. He has been smoking about 0.00 packs per day. He has never used smokeless tobacco. He reports that he does not drink alcohol and does not use drugs. Family History: family history includes Asthma in his mother; Heart Disease in his father; Adebayo Dye in his father. The patients home medications have been reviewed. Allergies: Benadryl [diphenhydramine hcl] and Other    -------------------------------------------------- RESULTS -------------------------------------------------  All laboratory and radiology results have been personally reviewed by myself   LABS:  No results found for this visit on 11/18/21. RADIOLOGY:  Interpreted by Radiologist.  No orders to display       ------------------------- NURSING NOTES AND VITALS REVIEWED ---------------------------   The nursing notes within the ED encounter and vital signs as below have been reviewed.    BP 121/69   Pulse 94   Temp 97.9 °F (36.6 °C) (Oral)   Resp 18   Ht 5' 11\" (1.803 m)   Wt 160 lb (72.6 kg)   SpO2 99%   BMI 22.32 kg/m²   Oxygen Saturation Interpretation: Normal      ---------------------------------------------------PHYSICAL EXAM--------------------------------------      Constitutional/General: Alert and oriented x3, well appearing, non toxic in NAD  Head: NC/AT  Eyes: PERRL, EOMI  Mouth: Oropharynx clear, handling secretions, no trismus  Neck: Supple, full ROM, no meningeal signs  Pulmonary: Lungs clear to auscultation bilaterally, no wheezes, rales, or rhonchi. Not in respiratory distress  Cardiovascular:  Regular rate and rhythm, no murmurs, gallops, or rubs. 2+ distal pulses  Abdomen: Soft, non tender, non distended, there is no guarding or rebound surgical scar noted midline healing well slightly hypertrophic otherwise. Extremities: Moves all extremities x 4. Warm and well perfused  Skin: warm and dry without rash  Neurologic: GCS 15,  Psych: Normal Affect      ------------------------------ ED COURSE/MEDICAL DECISION MAKING----------------------  Medications   aluminum & magnesium hydroxide-simethicone (MAALOX) 30 mL, lidocaine viscous hcl (XYLOCAINE) 5 mL (GI COCKTAIL) ( Oral Not Given 11/18/21 1046)   pantoprazole (PROTONIX) tablet 40 mg (40 mg Oral Given 11/18/21 1046)         Medical Decision Making:    Patient is nontoxic not tachypneic not hypoxic abdomen is soft nontender is no guarding or rebound medications will be refilled for patient advised follow-up in general medical clinic or [facility] return as needed    Counseling: The emergency provider has spoken with the patient and discussed todays results, in addition to providing specific details for the plan of care and counseling regarding the diagnosis and prognosis.   Questions are answered at this time and they are agreeable with the plan.      --------------------------------- IMPRESSION AND DISPOSITION ---------------------------------    IMPRESSION  1.  Abdominal pain, epigastric        DISPOSITION  Disposition: Discharge to home  Patient condition is good                  Debora Rasheed MD  11/18/21 7363

## 2021-12-09 ENCOUNTER — APPOINTMENT (OUTPATIENT)
Dept: GENERAL RADIOLOGY | Age: 36
End: 2021-12-09
Payer: MEDICAID

## 2021-12-09 ENCOUNTER — HOSPITAL ENCOUNTER (EMERGENCY)
Age: 36
Discharge: LEFT BEFORE DISCHARGE | End: 2021-12-09
Attending: STUDENT IN AN ORGANIZED HEALTH CARE EDUCATION/TRAINING PROGRAM
Payer: MEDICAID

## 2021-12-09 VITALS
TEMPERATURE: 97.8 F | WEIGHT: 170 LBS | OXYGEN SATURATION: 94 % | BODY MASS INDEX: 23.71 KG/M2 | DIASTOLIC BLOOD PRESSURE: 72 MMHG | SYSTOLIC BLOOD PRESSURE: 108 MMHG | RESPIRATION RATE: 16 BRPM | HEART RATE: 89 BPM

## 2021-12-09 DIAGNOSIS — R10.84 GENERALIZED ABDOMINAL PAIN: ICD-10-CM

## 2021-12-09 DIAGNOSIS — R55 SYNCOPE AND COLLAPSE: Primary | ICD-10-CM

## 2021-12-09 LAB
ALBUMIN SERPL-MCNC: 4.2 G/DL (ref 3.5–5.2)
ALP BLD-CCNC: 63 U/L (ref 40–129)
ALT SERPL-CCNC: 31 U/L (ref 0–40)
AMYLASE: 66 U/L (ref 20–100)
ANION GAP SERPL CALCULATED.3IONS-SCNC: 12 MMOL/L (ref 7–16)
APTT: 32 SEC (ref 24.5–35.1)
AST SERPL-CCNC: 20 U/L (ref 0–39)
BILIRUB SERPL-MCNC: <0.2 MG/DL (ref 0–1.2)
BUN BLDV-MCNC: 15 MG/DL (ref 6–20)
CALCIUM SERPL-MCNC: 9.5 MG/DL (ref 8.6–10.2)
CHLORIDE BLD-SCNC: 102 MMOL/L (ref 98–107)
CO2: 26 MMOL/L (ref 22–29)
CREAT SERPL-MCNC: 1.1 MG/DL (ref 0.7–1.2)
GFR AFRICAN AMERICAN: >60
GFR NON-AFRICAN AMERICAN: >60 ML/MIN/1.73
GLUCOSE BLD-MCNC: 130 MG/DL (ref 74–99)
INR BLD: 1.2
LACTIC ACID, SEPSIS: 1.7 MMOL/L (ref 0.5–1.9)
LIPASE: 25 U/L (ref 13–60)
MAGNESIUM: 2.2 MG/DL (ref 1.6–2.6)
POTASSIUM SERPL-SCNC: 4.2 MMOL/L (ref 3.5–5)
PROTHROMBIN TIME: 12.9 SEC (ref 9.3–12.4)
SODIUM BLD-SCNC: 140 MMOL/L (ref 132–146)
TOTAL PROTEIN: 7.2 G/DL (ref 6.4–8.3)
TROPONIN, HIGH SENSITIVITY: <6 NG/L (ref 0–11)

## 2021-12-09 PROCEDURE — 85610 PROTHROMBIN TIME: CPT

## 2021-12-09 PROCEDURE — 6360000002 HC RX W HCPCS: Performed by: PHYSICIAN ASSISTANT

## 2021-12-09 PROCEDURE — 93005 ELECTROCARDIOGRAM TRACING: CPT | Performed by: EMERGENCY MEDICINE

## 2021-12-09 PROCEDURE — 85730 THROMBOPLASTIN TIME PARTIAL: CPT

## 2021-12-09 PROCEDURE — 84484 ASSAY OF TROPONIN QUANT: CPT

## 2021-12-09 PROCEDURE — 96375 TX/PRO/DX INJ NEW DRUG ADDON: CPT

## 2021-12-09 PROCEDURE — 80053 COMPREHEN METABOLIC PANEL: CPT

## 2021-12-09 PROCEDURE — 71045 X-RAY EXAM CHEST 1 VIEW: CPT

## 2021-12-09 PROCEDURE — 96374 THER/PROPH/DIAG INJ IV PUSH: CPT

## 2021-12-09 PROCEDURE — 83605 ASSAY OF LACTIC ACID: CPT

## 2021-12-09 PROCEDURE — 83735 ASSAY OF MAGNESIUM: CPT

## 2021-12-09 PROCEDURE — 99283 EMERGENCY DEPT VISIT LOW MDM: CPT

## 2021-12-09 PROCEDURE — 82150 ASSAY OF AMYLASE: CPT

## 2021-12-09 PROCEDURE — 36415 COLL VENOUS BLD VENIPUNCTURE: CPT

## 2021-12-09 PROCEDURE — 2580000003 HC RX 258: Performed by: PHYSICIAN ASSISTANT

## 2021-12-09 PROCEDURE — C9113 INJ PANTOPRAZOLE SODIUM, VIA: HCPCS | Performed by: PHYSICIAN ASSISTANT

## 2021-12-09 PROCEDURE — 83690 ASSAY OF LIPASE: CPT

## 2021-12-09 RX ORDER — ACETAMINOPHEN 325 MG/1
650 TABLET ORAL EVERY 6 HOURS PRN
COMMUNITY

## 2021-12-09 RX ORDER — PANTOPRAZOLE SODIUM 40 MG/10ML
40 INJECTION, POWDER, LYOPHILIZED, FOR SOLUTION INTRAVENOUS ONCE
Status: COMPLETED | OUTPATIENT
Start: 2021-12-09 | End: 2021-12-09

## 2021-12-09 RX ORDER — METOCLOPRAMIDE HYDROCHLORIDE 5 MG/ML
10 INJECTION INTRAMUSCULAR; INTRAVENOUS ONCE
Status: COMPLETED | OUTPATIENT
Start: 2021-12-09 | End: 2021-12-09

## 2021-12-09 RX ORDER — 0.9 % SODIUM CHLORIDE 0.9 %
1000 INTRAVENOUS SOLUTION INTRAVENOUS ONCE
Status: COMPLETED | OUTPATIENT
Start: 2021-12-09 | End: 2021-12-09

## 2021-12-09 RX ADMIN — SODIUM CHLORIDE 1000 ML: 9 INJECTION, SOLUTION INTRAVENOUS at 22:29

## 2021-12-09 RX ADMIN — PANTOPRAZOLE SODIUM 40 MG: 40 INJECTION, POWDER, FOR SOLUTION INTRAVENOUS at 22:29

## 2021-12-09 RX ADMIN — METOCLOPRAMIDE HYDROCHLORIDE 10 MG: 5 INJECTION INTRAMUSCULAR; INTRAVENOUS at 22:29

## 2021-12-09 ASSESSMENT — PAIN DESCRIPTION - ORIENTATION: ORIENTATION: MID;UPPER

## 2021-12-09 ASSESSMENT — PAIN DESCRIPTION - DESCRIPTORS: DESCRIPTORS: STABBING

## 2021-12-09 ASSESSMENT — PAIN DESCRIPTION - LOCATION: LOCATION: ABDOMEN

## 2021-12-09 ASSESSMENT — PAIN SCALES - GENERAL: PAINLEVEL_OUTOF10: 8

## 2021-12-09 ASSESSMENT — PAIN DESCRIPTION - PAIN TYPE: TYPE: ACUTE PAIN

## 2021-12-10 LAB
EKG ATRIAL RATE: 91 BPM
EKG P AXIS: 76 DEGREES
EKG P-R INTERVAL: 160 MS
EKG Q-T INTERVAL: 348 MS
EKG QRS DURATION: 74 MS
EKG QTC CALCULATION (BAZETT): 428 MS
EKG R AXIS: 81 DEGREES
EKG T AXIS: 53 DEGREES
EKG VENTRICULAR RATE: 91 BPM

## 2021-12-10 PROCEDURE — 93010 ELECTROCARDIOGRAM REPORT: CPT | Performed by: INTERNAL MEDICINE

## 2021-12-10 NOTE — ED NOTES
Bed: 09  Expected date: 12/9/21  Expected time:   Means of arrival:   Comments:  Amr abd pain  43 male     [facility]  12/09/21 6700

## 2021-12-10 NOTE — ED PROVIDER NOTES
Independent:    HPI:  12/9/21, Time: 9:21 PM EULALIA Harper is a 43 y.o. male presenting to the ED for evaluation of abdominal pain onset over the last 3 to 4 days with some diarrhea and nausea, but no vomiting. He has had some fatigue and was at the counter in the preparing something to eat when he was walking from the kitchen and apparently felt lightheaded and dizzy and had a syncopal episode. He does have a history of apparent perforated gastric ulcer with pneumoperitoneum when he was incarcerated back in October and he was life flighted from jail to [facility] and underwent surgery. He states he has had persistent abdominal pain since that time. They told him it would take at least a year to recover from this. He states he periodically will take a sip of Pepto-Bismol over-the-counter or a Carafate that he had from previous. He states he has been trying to eat a healthy diet and avoid any spicy foods and eating a lot of fish and vegetables. He has lost quite a bit of weight. He states he has been urinating normally. He denies any fever or chills. He did have the COVID vaccines when he was incarcerated. He has not had the COVID booster. He did strike the left side of his back and did sustain an abrasion when he fell. He does complain of head and neck pain. He has had a squeezing sensation across his upper chest.  He attributes this to his recent abdominal pain. He complains of no current SOB. His current abdominal complaints have been persistent, moderate to severe in severity.     Review of Systems:   A complete review of systems was performed and pertinent positives and negatives are stated within HPI, all other systems reviewed and are negative.      --------------------------------------------- PAST HISTORY ---------------------------------------------  Past Medical History:  has a past medical history of Acute gastric ulcer with perforation ([address] Dermatophytosis, Helicobacter pylori infection, Plantar fasciitis, and Pneumoperitoneum. Past Surgical History:  has a past surgical history that includes hernia repair and Abdomen surgery. Social History:  reports that he has been smoking cigarettes. He has been smoking about 0.00 packs per day. He has never used smokeless tobacco. He reports that he does not drink alcohol and does not use drugs. Family History: family history includes Asthma in his mother; Heart Disease in his father; Vega Shen in his father. The patients home medications have been reviewed.     Allergies: Benadryl [diphenhydramine hcl] and Other    -------------------------------------------------- RESULTS -------------------------------------------------  All laboratory and radiology results have been personally reviewed by myself   LABS:  Results for orders placed or performed during the hospital encounter of 12/09/21   Comprehensive metabolic panel   Result Value Ref Range    Sodium 140 132 - 146 mmol/L    Potassium 4.2 3.5 - 5.0 mmol/L    Chloride 102 98 - 107 mmol/L    CO2 26 22 - 29 mmol/L    Anion Gap 12 7 - 16 mmol/L    Glucose 130 (H) 74 - 99 mg/dL    BUN 15 6 - 20 mg/dL    CREATININE 1.1 0.7 - 1.2 mg/dL    GFR Non-African American >60 >=60 mL/min/1.73    GFR African American >60     Calcium 9.5 8.6 - 10.2 mg/dL    Total Protein 7.2 6.4 - 8.3 g/dL    Albumin 4.2 3.5 - 5.2 g/dL    Total Bilirubin <0.2 0.0 - 1.2 mg/dL    Alkaline Phosphatase 63 40 - 129 U/L    ALT 31 0 - 40 U/L    AST 20 0 - 39 U/L   Lipase   Result Value Ref Range    Lipase 25 13 - 60 U/L   Amylase   Result Value Ref Range    Amylase 66 20 - 100 U/L   Lactate, Sepsis   Result Value Ref Range    Lactic Acid, Sepsis 1.7 0.5 - 1.9 mmol/L   Troponin   Result Value Ref Range    Troponin, High Sensitivity <6 0 - 11 ng/L   Protime-INR   Result Value Ref Range    Protime 12.9 (H) 9.3 - 12.4 sec    INR 1.2    APTT   Result Value Ref Range    aPTT 32.0 24.5 - 35.1 sec   Magnesium Affect      ------------------------------ ED COURSE/MEDICAL DECISION MAKING----------------------  Medications   0.9 % sodium chloride bolus (0 mLs IntraVENous Stopped 12/9/21 2253)   pantoprazole (PROTONIX) injection 40 mg (40 mg IntraVENous Given 12/9/21 2229)   metoclopramide (REGLAN) injection 10 mg (10 mg IntraVENous Given 12/9/21 2229)         ED COURSE:  ED Course as of 12/09/21 2354   Thu Dec 09, 2021   2203 EKG @2158: This EKG is signed and interpreted by me. Rate: 91  Rhythm: Sinus  Interpretation: NSR, normal axis, , QRS 74, , stable c/w 8/15/21  Comparison: stable as compared to patient's most recent EKG   [ME]      ED Course User Index  [ME] Brenda Fleming,        Medical Decision Making:    Patient to ER, complaints of abdominal pain, has been ongoing since surgery in October. Patient had apparent syncopal episode when he became lightheaded and dizzy when preparing food in his kitchen at home earlier today. Patient arrived via EMS for evaluation in ER tonight. Patient alert and oriented x3, having abdominal pain but otherwise in no acute distress on initial evaluation. EKG performed and lab studies obtained. Patient advised of need to undergo CT imaging of his head and neck as well as abdomen and pelvis. IV fluids as well as Protonix and Reglan given. Labs returning. Awaiting Imaging to be completed. Called to bedside by nursing at 10:50PM, patient does not want to stay any longer, He is feeling improved after IV Protonix and Reglan given. He wants to go home now. His mother and brother are here and is going home. Dr. Blessing Hastings aware. 12/9/21 / Time:  10:52PM 10:52 PM EST. This patient has chosen to leave against medical advice. I have personally explained to him that choosing to do so may result in permanent bodily harm or death.   I discussed at length that without further evaluation and monitoring there may be unforeseen circumstances and deterioration resulting in permanent bodily harm or death as a result of their choice. They are alert, oriented, and competent at this time. They state that they are aware of the serious risks as explained, but they continue to wish to leave against medical advice. In light of their decision to leave against medical advice, follow-up has been arranged and they are aware of the importance of following up as instructed. They have been advised that they should return to the ED immediately if they change their mind at any time, or if their condition begins to change or worsen. ------------------------------------------------------------------------------------------      Counseling: The emergency provider has spoken with the patient and discussed todays results, in addition to providing specific details for the plan of care and counseling regarding the diagnosis and prognosis. Questions are answered at this time and they are agreeable with the plan.      --------------------------------- IMPRESSION AND DISPOSITION ---------------------------------    IMPRESSION  1. Syncope and collapse    2. Generalized abdominal pain        DISPOSITION  Disposition: Other Disposition: Left AMA  Patient condition is stable      NOTE: This report was transcribed using voice recognition software.  Every effort was made to ensure accuracy; however, inadvertent computerized transcription errors may be present       Alana Pearson PA-C  12/09/21 6087

## 2021-12-10 NOTE — ED NOTES
Assigned nurse called to patient room with patient stating that he did not want to stay any longer at this time due to feeling better. Explained to patient of the necessity of staying but patient again stated that he did not want to stay. Gwen Jacome was notified and patient signed AMA form as well as this nurse. Applied to paper chart.       Carmencita Guo RN  12/09/21 7909

## 2022-01-14 ENCOUNTER — HOSPITAL ENCOUNTER (EMERGENCY)
Age: 37
Discharge: LEFT BEFORE DISCHARGE | End: 2022-01-14
Attending: EMERGENCY MEDICINE
Payer: MEDICAID

## 2022-01-14 VITALS
WEIGHT: 180 LBS | HEART RATE: 94 BPM | DIASTOLIC BLOOD PRESSURE: 83 MMHG | TEMPERATURE: 98.9 F | SYSTOLIC BLOOD PRESSURE: 142 MMHG | RESPIRATION RATE: 18 BRPM | OXYGEN SATURATION: 99 % | BODY MASS INDEX: 25.1 KG/M2

## 2022-01-14 DIAGNOSIS — Z53.21 ELOPED FROM EMERGENCY DEPARTMENT: Primary | ICD-10-CM

## 2022-01-14 LAB
ALBUMIN SERPL-MCNC: 4.9 G/DL (ref 3.5–5.2)
ALP BLD-CCNC: 66 U/L (ref 40–129)
ALT SERPL-CCNC: 21 U/L (ref 0–40)
ANION GAP SERPL CALCULATED.3IONS-SCNC: 9 MMOL/L (ref 7–16)
AST SERPL-CCNC: 15 U/L (ref 0–39)
BASOPHILS ABSOLUTE: 0.16 E9/L (ref 0–0.2)
BASOPHILS RELATIVE PERCENT: 1.4 % (ref 0–2)
BILIRUB SERPL-MCNC: 0.5 MG/DL (ref 0–1.2)
BUN BLDV-MCNC: 13 MG/DL (ref 6–20)
CALCIUM SERPL-MCNC: 10.3 MG/DL (ref 8.6–10.2)
CHLORIDE BLD-SCNC: 103 MMOL/L (ref 98–107)
CO2: 27 MMOL/L (ref 22–29)
CREAT SERPL-MCNC: 1 MG/DL (ref 0.7–1.2)
EOSINOPHILS ABSOLUTE: 0.27 E9/L (ref 0.05–0.5)
EOSINOPHILS RELATIVE PERCENT: 2.4 % (ref 0–6)
GFR AFRICAN AMERICAN: >60
GFR NON-AFRICAN AMERICAN: >60 ML/MIN/1.73
GLUCOSE BLD-MCNC: 95 MG/DL (ref 74–99)
HCT VFR BLD CALC: 54.5 % (ref 37–54)
HEMOGLOBIN: 17.7 G/DL (ref 12.5–16.5)
IMMATURE GRANULOCYTES #: 0.04 E9/L
IMMATURE GRANULOCYTES %: 0.4 % (ref 0–5)
LACTIC ACID, SEPSIS: 1.4 MMOL/L (ref 0.5–1.9)
LIPASE: 29 U/L (ref 13–60)
LYMPHOCYTES ABSOLUTE: 2.3 E9/L (ref 1.5–4)
LYMPHOCYTES RELATIVE PERCENT: 20.6 % (ref 20–42)
MCH RBC QN AUTO: 28.5 PG (ref 26–35)
MCHC RBC AUTO-ENTMCNC: 32.5 % (ref 32–34.5)
MCV RBC AUTO: 87.9 FL (ref 80–99.9)
MONOCYTES ABSOLUTE: 0.79 E9/L (ref 0.1–0.95)
MONOCYTES RELATIVE PERCENT: 7.1 % (ref 2–12)
NEUTROPHILS ABSOLUTE: 7.62 E9/L (ref 1.8–7.3)
NEUTROPHILS RELATIVE PERCENT: 68.1 % (ref 43–80)
PDW BLD-RTO: 13.2 FL (ref 11.5–15)
PLATELET # BLD: 298 E9/L (ref 130–450)
PMV BLD AUTO: 11.1 FL (ref 7–12)
POTASSIUM SERPL-SCNC: 4.6 MMOL/L (ref 3.5–5)
RBC # BLD: 6.2 E12/L (ref 3.8–5.8)
SODIUM BLD-SCNC: 139 MMOL/L (ref 132–146)
TOTAL PROTEIN: 8.5 G/DL (ref 6.4–8.3)
WBC # BLD: 11.2 E9/L (ref 4.5–11.5)

## 2022-01-14 PROCEDURE — 83605 ASSAY OF LACTIC ACID: CPT

## 2022-01-14 PROCEDURE — 85025 COMPLETE CBC W/AUTO DIFF WBC: CPT

## 2022-01-14 PROCEDURE — 4500000002 HC ER NO CHARGE

## 2022-01-14 PROCEDURE — 80053 COMPREHEN METABOLIC PANEL: CPT

## 2022-01-14 PROCEDURE — 83690 ASSAY OF LIPASE: CPT

## 2022-01-14 RX ORDER — ONDANSETRON 4 MG/1
4 TABLET, ORALLY DISINTEGRATING ORAL ONCE
Status: DISCONTINUED | OUTPATIENT
Start: 2022-01-14 | End: 2022-01-14 | Stop reason: HOSPADM

## 2022-01-14 ASSESSMENT — PAIN SCALES - GENERAL: PAINLEVEL_OUTOF10: 8

## 2022-01-14 NOTE — ED TRIAGE NOTES
Department of Emergency Medicine  FIRST PROVIDER TRIAGE NOTE             Independent [facility]           1/14/22  3:50 PM EST    Date of Encounter: 1/14/22   MRN: [id number]      HPI: Cristy Goltz is a 43 y.o. male who presents to the ED for Abdominal Pain (burning sensation, hx of ruptured ulcer, feels same. cant poop or pee, last BM  5 days ago, peeing 2x day.  ) and Emesis (with some blood )      ROS: Negative for cp or sob. PE: Gen Appearance/Constitutional: alert  Musculoskeletal: moves all extremities x 4     Initial Plan of Care: All treatment areas with department are currently occupied. Plan to order/Initiate the following while awaiting opening in ED: labs. Initial Plan of Care: Initiate Treatment-Testing, Proceed toTreatment Area When Bed Available for ED Attending/MLP to Continue Care    Electronically signed by NATANAEL Leslie CNP   DD: 1/14/22      . Department of Emergency Medicine  FIRST PROVIDER ELOPEMENT NOTE                 Independent [facility]          1/14/22  3:50 PM EST    MRN: [id number]    HPI: Cristy Goltz is a 43 y.o. male who presents to the ED with the following complaint: Abdominal Pain (burning sensation, hx of ruptured ulcer, feels same.   cant poop or pee, last BM  5 days ago, peeing 2x day.  ) and Emesis (with some blood )      (Please refer to [address] [place] [address] for pertinent ROS and PE documentation)  Labs:  Results for orders placed or performed during the hospital encounter of 01/14/22   CBC auto differential   Result Value Ref Range    WBC 11.2 4.5 - 11.5 E9/L    RBC 6.20 (H) 3.80 - 5.80 E12/L    Hemoglobin 17.7 (H) 12.5 - 16.5 g/dL    Hematocrit 54.5 (H) 37.0 - 54.0 %    MCV 87.9 80.0 - 99.9 fL    MCH 28.5 26.0 - 35.0 pg    MCHC 32.5 32.0 - 34.5 %    RDW 13.2 11.5 - 15.0 fL    Platelets 237 119 - 470 E9/L    MPV 11.1 7.0 - 12.0 fL    Neutrophils % 68.1 43.0 - 80.0 %    Immature Granulocytes % 0.4 0.0 - 5.0 %    Lymphocytes % 20.6 20.0 - 42.0 %    Monocytes % 7.1 2.0 - 12.0 %    Eosinophils % 2.4 0.0 - 6.0 %    Basophils % 1.4 0.0 - 2.0 %    Neutrophils Absolute 7.62 (H) 1.80 - 7.30 E9/L    Immature Granulocytes # 0.04 E9/L    Lymphocytes Absolute 2.30 1.50 - 4.00 E9/L    Monocytes Absolute 0.79 0.10 - 0.95 E9/L    Eosinophils Absolute 0.27 0.05 - 0.50 E9/L    Basophils Absolute 0.16 0.00 - 0.20 E9/L   Comprehensive Metabolic Panel   Result Value Ref Range    Sodium 139 132 - 146 mmol/L    Potassium 4.6 3.5 - 5.0 mmol/L    Chloride 103 98 - 107 mmol/L    CO2 27 22 - 29 mmol/L    Anion Gap 9 7 - 16 mmol/L    Glucose 95 74 - 99 mg/dL    BUN 13 6 - 20 mg/dL    CREATININE 1.0 0.7 - 1.2 mg/dL    GFR Non-African American >60 >=60 mL/min/1.73    GFR African American >60     Calcium 10.3 (H) 8.6 - 10.2 mg/dL    Total Protein 8.5 (H) 6.4 - 8.3 g/dL    Albumin 4.9 3.5 - 5.2 g/dL    Total Bilirubin 0.5 0.0 - 1.2 mg/dL    Alkaline Phosphatase 66 40 - 129 U/L    ALT 21 0 - 40 U/L    AST 15 0 - 39 U/L   Lactate, Sepsis   Result Value Ref Range    Lactic Acid, Sepsis 1.4 0.5 - 1.9 mmol/L   Lipase   Result Value Ref Range    Lipase 29 13 - 60 U/L     Imaging: All Radiology results interpreted by Radiologist unless otherwise noted. No orders to display     ED Course      Medications   aluminum & magnesium hydroxide-simethicone (MAALOX) 30 mL, lidocaine viscous hcl (XYLOCAINE) 5 mL (GI COCKTAIL) (has no administration in time range)   ondansetron (ZOFRAN-ODT) disintegrating tablet 4 mg (has no administration in time range)        -------------------------  Disposition    Patient ELOPED from the department prior to completion of evaluation after triage. Results of triage orders that were completed at time of elopement as indicated above were reviewed.     Diagnosis at Time of Elopement: (Based on presenting complaint/available test results):   Emesis, abdominal pain    Electronically signed by NATANAEL Garcia CNP   DD: 1/14/22

## 2022-01-14 NOTE — ED NOTES
Patient was called to be placed into a room. Patient not found to be in the waiting room. Will check again.       [address], RN  01/14/22 3558

## 2022-03-07 NOTE — ED NOTES
Department of Emergency Medicine  FIRST PROVIDER TRIAGE NOTE             Independent [facility]           1/14/22  3:50 PM EST    Date of Encounter: 1/14/22   MRN: [id number]      HPI: Shanique Rashid is a 43 y.o. male who presents to the ED for Abdominal Pain (burning sensation, hx of ruptured ulcer, feels same. cant poop or pee, last BM  5 days ago, peeing 2x day.  ) and Emesis (with some blood )      ROS: Negative for cp or sob. PE: Gen Appearance/Constitutional: alert  Musculoskeletal: moves all extremities x 4     Initial Plan of Care: All treatment areas with department are currently occupied. Plan to order/Initiate the following while awaiting opening in ED: labs. Initial Plan of Care: Initiate Treatment-Testing, Proceed toTreatment Area When Bed Available for ED Attending/MLP to Continue Care    Electronically signed by NATANAEL Kaur CNP   DD: 1/14/22      . Department of Emergency Medicine  FIRST PROVIDER ELOPEMENT NOTE                 Independent [facility]          1/14/22  3:50 PM EST    MRN: [id number]    HPI: Shanique Rashid is a 43 y.o. male who presents to the ED with the following complaint: Abdominal Pain (burning sensation, hx of ruptured ulcer, feels same.   cant poop or pee, last BM  5 days ago, peeing 2x day.  ) and Emesis (with some blood )      (Please refer to [address] [place] [address] for pertinent ROS and PE documentation)  Labs:  Results for orders placed or performed during the hospital encounter of 01/14/22   CBC auto differential   Result Value Ref Range    WBC 11.2 4.5 - 11.5 E9/L    RBC 6.20 (H) 3.80 - 5.80 E12/L    Hemoglobin 17.7 (H) 12.5 - 16.5 g/dL    Hematocrit 54.5 (H) 37.0 - 54.0 %    MCV 87.9 80.0 - 99.9 fL    MCH 28.5 26.0 - 35.0 pg    MCHC 32.5 32.0 - 34.5 %    RDW 13.2 11.5 - 15.0 fL    Platelets 198 474 - 169 E9/L    MPV 11.1 7.0 - 12.0 fL    Neutrophils % 68.1 43.0 - 80.0 %    Immature Granulocytes % 0.4 0.0 - 5.0 %    Lymphocytes % 20.6 20.0 - 42.0 %    Monocytes % 7.1 2.0 - 12.0 %    Eosinophils % 2.4 0.0 - 6.0 %    Basophils % 1.4 0.0 - 2.0 %    Neutrophils Absolute 7.62 (H) 1.80 - 7.30 E9/L    Immature Granulocytes # 0.04 E9/L    Lymphocytes Absolute 2.30 1.50 - 4.00 E9/L    Monocytes Absolute 0.79 0.10 - 0.95 E9/L    Eosinophils Absolute 0.27 0.05 - 0.50 E9/L    Basophils Absolute 0.16 0.00 - 0.20 E9/L   Comprehensive Metabolic Panel   Result Value Ref Range    Sodium 139 132 - 146 mmol/L    Potassium 4.6 3.5 - 5.0 mmol/L    Chloride 103 98 - 107 mmol/L    CO2 27 22 - 29 mmol/L    Anion Gap 9 7 - 16 mmol/L    Glucose 95 74 - 99 mg/dL    BUN 13 6 - 20 mg/dL    CREATININE 1.0 0.7 - 1.2 mg/dL    GFR Non-African American >60 >=60 mL/min/1.73    GFR African American >60     Calcium 10.3 (H) 8.6 - 10.2 mg/dL    Total Protein 8.5 (H) 6.4 - 8.3 g/dL    Albumin 4.9 3.5 - 5.2 g/dL    Total Bilirubin 0.5 0.0 - 1.2 mg/dL    Alkaline Phosphatase 66 40 - 129 U/L    ALT 21 0 - 40 U/L    AST 15 0 - 39 U/L   Lactate, Sepsis   Result Value Ref Range    Lactic Acid, Sepsis 1.4 0.5 - 1.9 mmol/L   Lipase   Result Value Ref Range    Lipase 29 13 - 60 U/L     Imaging: All Radiology results interpreted by Radiologist unless otherwise noted. No orders to display     ED Course      Medications   aluminum & magnesium hydroxide-simethicone (MAALOX) 30 mL, lidocaine viscous hcl (XYLOCAINE) 5 mL (GI COCKTAIL) (has no administration in time range)   ondansetron (ZOFRAN-ODT) disintegrating tablet 4 mg (has no administration in time range)        -------------------------  Disposition    Patient ELOPED from the department prior to completion of evaluation after triage. Results of triage orders that were completed at time of elopement as indicated above were reviewed.     Diagnosis at Time of Elopement: (Based on presenting complaint/available test results):   Emesis, abdominal pain    Electronically signed by NATANAEL Gonzalez CNP   DD: 1/14/22     Jame Franco Nicanor Hayes - CNP  03/07/22 6919

## 2023-04-16 ENCOUNTER — HOSPITAL ENCOUNTER (EMERGENCY)
Age: 38
Discharge: LEFT BEFORE DISCHARGE | End: 2023-04-17
Attending: EMERGENCY MEDICINE
Payer: MEDICAID

## 2023-04-16 ENCOUNTER — APPOINTMENT (OUTPATIENT)
Dept: CT IMAGING | Age: 38
End: 2023-04-16
Payer: MEDICAID

## 2023-04-16 ENCOUNTER — HOSPITAL ENCOUNTER (EMERGENCY)
Age: 38
Discharge: LEFT BEFORE DISCHARGE | End: 2023-04-16
Attending: EMERGENCY MEDICINE
Payer: MEDICAID

## 2023-04-16 ENCOUNTER — APPOINTMENT (OUTPATIENT)
Dept: GENERAL RADIOLOGY | Age: 38
End: 2023-04-16
Payer: MEDICAID

## 2023-04-16 VITALS
HEIGHT: 68 IN | BODY MASS INDEX: 25.76 KG/M2 | OXYGEN SATURATION: 96 % | HEART RATE: 99 BPM | WEIGHT: 170 LBS | RESPIRATION RATE: 16 BRPM | DIASTOLIC BLOOD PRESSURE: 74 MMHG | TEMPERATURE: 98.7 F | SYSTOLIC BLOOD PRESSURE: 125 MMHG

## 2023-04-16 DIAGNOSIS — Z91.89 AT HIGH RISK FOR ASPIRATION: ICD-10-CM

## 2023-04-16 DIAGNOSIS — T40.1X4A HEROIN OVERDOSE, UNDETERMINED INTENT, INITIAL ENCOUNTER (HCC): Primary | ICD-10-CM

## 2023-04-16 DIAGNOSIS — R56.9 SEIZURE-LIKE ACTIVITY (HCC): ICD-10-CM

## 2023-04-16 DIAGNOSIS — T50.901A ACCIDENTAL OVERDOSE, INITIAL ENCOUNTER: ICD-10-CM

## 2023-04-16 DIAGNOSIS — F19.10 POLYSUBSTANCE ABUSE (HCC): Primary | ICD-10-CM

## 2023-04-16 DIAGNOSIS — J18.9 PNEUMONIA OF BOTH LUNGS DUE TO INFECTIOUS ORGANISM, UNSPECIFIED PART OF LUNG: ICD-10-CM

## 2023-04-16 DIAGNOSIS — J18.9 PNEUMONIA DUE TO INFECTIOUS ORGANISM, UNSPECIFIED LATERALITY, UNSPECIFIED PART OF LUNG: ICD-10-CM

## 2023-04-16 DIAGNOSIS — E87.20 LACTIC ACIDOSIS: ICD-10-CM

## 2023-04-16 LAB
ALBUMIN SERPL-MCNC: 3.9 G/DL (ref 3.5–5.2)
ALBUMIN SERPL-MCNC: 4.5 G/DL (ref 3.5–5.2)
ALP SERPL-CCNC: 47 U/L (ref 40–129)
ALP SERPL-CCNC: 69 U/L (ref 40–129)
ALT SERPL-CCNC: 22 U/L (ref 0–40)
ALT SERPL-CCNC: 22 U/L (ref 0–40)
AMPHET UR QL SCN: NOT DETECTED
ANION GAP SERPL CALCULATED.3IONS-SCNC: 10 MMOL/L (ref 7–16)
ANION GAP SERPL CALCULATED.3IONS-SCNC: 27 MMOL/L (ref 7–16)
APAP SERPL-MCNC: <5 MCG/ML (ref 10–30)
AST SERPL-CCNC: 33 U/L (ref 0–39)
AST SERPL-CCNC: 42 U/L (ref 0–39)
B.E.: -19.8 MMOL/L (ref -3–3)
BACTERIA URNS QL MICRO: ABNORMAL /HPF
BARBITURATES UR QL SCN: NOT DETECTED
BASOPHILS # BLD: 0.05 E9/L (ref 0–0.2)
BASOPHILS # BLD: 0.32 E9/L (ref 0–0.2)
BASOPHILS NFR BLD: 0.3 % (ref 0–2)
BASOPHILS NFR BLD: 1.3 % (ref 0–2)
BENZODIAZ UR QL SCN: NOT DETECTED
BILIRUB SERPL-MCNC: 0.3 MG/DL (ref 0–1.2)
BILIRUB SERPL-MCNC: 0.6 MG/DL (ref 0–1.2)
BILIRUB UR QL STRIP: NEGATIVE
BUN SERPL-MCNC: 15 MG/DL (ref 6–20)
BUN SERPL-MCNC: 16 MG/DL (ref 6–20)
CALCIUM SERPL-MCNC: 8.9 MG/DL (ref 8.6–10.2)
CALCIUM SERPL-MCNC: 9.2 MG/DL (ref 8.6–10.2)
CANNABINOIDS UR QL SCN: POSITIVE
CHLORIDE SERPL-SCNC: 103 MMOL/L (ref 98–107)
CHLORIDE SERPL-SCNC: 99 MMOL/L (ref 98–107)
CLARITY UR: CLEAR
CO2 SERPL-SCNC: 16 MMOL/L (ref 22–29)
CO2 SERPL-SCNC: 27 MMOL/L (ref 22–29)
COCAINE UR QL SCN: POSITIVE
COHB: 7.4 % (ref 0–1.5)
COLOR UR: YELLOW
CREAT SERPL-MCNC: 1.1 MG/DL (ref 0.7–1.2)
CREAT SERPL-MCNC: 1.6 MG/DL (ref 0.7–1.2)
CRITICAL: ABNORMAL
DATE ANALYZED: ABNORMAL
DATE OF COLLECTION: ABNORMAL
DRUG SCREEN COMMENT UR-IMP: ABNORMAL
EKG ATRIAL RATE: 110 BPM
EKG P AXIS: 98 DEGREES
EKG P-R INTERVAL: 178 MS
EKG Q-T INTERVAL: 336 MS
EKG QRS DURATION: 76 MS
EKG QTC CALCULATION (BAZETT): 454 MS
EKG R AXIS: 85 DEGREES
EKG T AXIS: 119 DEGREES
EKG VENTRICULAR RATE: 110 BPM
EOSINOPHIL # BLD: 0.18 E9/L (ref 0.05–0.5)
EOSINOPHIL # BLD: 0.35 E9/L (ref 0.05–0.5)
EOSINOPHIL NFR BLD: 0.9 % (ref 0–6)
EOSINOPHIL NFR BLD: 1.4 % (ref 0–6)
EPI CELLS #/AREA URNS HPF: ABNORMAL /HPF
ERYTHROCYTE [DISTWIDTH] IN BLOOD BY AUTOMATED COUNT: 13.3 FL (ref 11.5–15)
ERYTHROCYTE [DISTWIDTH] IN BLOOD BY AUTOMATED COUNT: 13.4 FL (ref 11.5–15)
ETHANOLAMINE SERPL-MCNC: <10 MG/DL (ref 0–0.08)
FENTANYL SCREEN, URINE: POSITIVE
GLUCOSE SERPL-MCNC: 146 MG/DL (ref 74–99)
GLUCOSE SERPL-MCNC: 177 MG/DL (ref 74–99)
GLUCOSE UR STRIP-MCNC: NEGATIVE MG/DL
HCO3: 11.2 MMOL/L (ref 22–26)
HCT VFR BLD AUTO: 43 % (ref 37–54)
HCT VFR BLD AUTO: 54.6 % (ref 37–54)
HGB BLD-MCNC: 14 G/DL (ref 12.5–16.5)
HGB BLD-MCNC: 16.4 G/DL (ref 12.5–16.5)
HGB UR QL STRIP: ABNORMAL
HHB: 17.4 % (ref 0–5)
IMM GRANULOCYTES # BLD: 0.14 E9/L
IMM GRANULOCYTES # BLD: 0.99 E9/L
IMM GRANULOCYTES NFR BLD: 0.7 % (ref 0–5)
IMM GRANULOCYTES NFR BLD: 3.9 % (ref 0–5)
KETONES UR STRIP-MCNC: NEGATIVE MG/DL
LAB: ABNORMAL
LACTATE BLDV-SCNC: 13.2 MMOL/L (ref 0.5–2.2)
LACTATE BLDV-SCNC: 2 MMOL/L (ref 0.5–2.2)
LEUKOCYTE ESTERASE UR QL STRIP: NEGATIVE
LYMPHOCYTES # BLD: 1.36 E9/L (ref 1.5–4)
LYMPHOCYTES # BLD: 3.8 E9/L (ref 1.5–4)
LYMPHOCYTES NFR BLD: 15.1 % (ref 20–42)
LYMPHOCYTES NFR BLD: 7.2 % (ref 20–42)
Lab: ABNORMAL
MCH RBC QN AUTO: 29 PG (ref 26–35)
MCH RBC QN AUTO: 29.4 PG (ref 26–35)
MCHC RBC AUTO-ENTMCNC: 30 % (ref 32–34.5)
MCHC RBC AUTO-ENTMCNC: 32.6 % (ref 32–34.5)
MCV RBC AUTO: 90.1 FL (ref 80–99.9)
MCV RBC AUTO: 96.6 FL (ref 80–99.9)
METER GLUCOSE: 145 MG/DL (ref 74–99)
METHADONE UR QL SCN: NOT DETECTED
METHB: 0.3 % (ref 0–1.5)
MODE: ABNORMAL
MONOCYTES # BLD: 1.11 E9/L (ref 0.1–0.95)
MONOCYTES # BLD: 1.74 E9/L (ref 0.1–0.95)
MONOCYTES NFR BLD: 5.9 % (ref 2–12)
MONOCYTES NFR BLD: 6.9 % (ref 2–12)
NEUTROPHILS # BLD: 16.12 E9/L (ref 1.8–7.3)
NEUTROPHILS # BLD: 17.9 E9/L (ref 1.8–7.3)
NEUTS SEG NFR BLD: 71.4 % (ref 43–80)
NEUTS SEG NFR BLD: 85 % (ref 43–80)
NITRITE UR QL STRIP: NEGATIVE
O2 CONTENT: 18.8 ML/DL
O2 SATURATION: 81.1 % (ref 92–98.5)
O2HB: 74.9 % (ref 94–97)
OPERATOR ID: 3342
OPIATES UR QL SCN: NOT DETECTED
OVALOCYTES: ABNORMAL
OXYCODONE URINE: NOT DETECTED
PATIENT TEMP: 37 C
PCO2: 44.8 MMHG (ref 35–45)
PCP UR QL SCN: NOT DETECTED
PH BLOOD GAS: 7.01 (ref 7.35–7.45)
PH UR STRIP: 5 [PH] (ref 5–9)
PLATELET # BLD AUTO: 252 E9/L (ref 130–450)
PLATELET # BLD AUTO: 356 E9/L (ref 130–450)
PMV BLD AUTO: 10.8 FL (ref 7–12)
PMV BLD AUTO: 11 FL (ref 7–12)
PO2: 55.5 MMHG (ref 75–100)
POIKILOCYTES: ABNORMAL
POTASSIUM SERPL-SCNC: 4 MMOL/L (ref 3.5–5)
POTASSIUM SERPL-SCNC: 4.4 MMOL/L (ref 3.5–5)
PROT SERPL-MCNC: 6.1 G/DL (ref 6.4–8.3)
PROT SERPL-MCNC: 7.2 G/DL (ref 6.4–8.3)
PROT UR STRIP-MCNC: 100 MG/DL
RBC # BLD AUTO: 4.77 E12/L (ref 3.8–5.8)
RBC # BLD AUTO: 5.65 E12/L (ref 3.8–5.8)
RBC #/AREA URNS HPF: ABNORMAL /HPF (ref 0–2)
SALICYLATES SERPL-MCNC: <0.3 MG/DL (ref 0–30)
SODIUM SERPL-SCNC: 136 MMOL/L (ref 132–146)
SODIUM SERPL-SCNC: 146 MMOL/L (ref 132–146)
SOURCE, BLOOD GAS: ABNORMAL
SP GR UR STRIP: >=1.03 (ref 1–1.03)
THB: 17.9 G/DL (ref 11.5–16.5)
TIME ANALYZED: 50
TRICYCLIC ANTIDEPRESSANTS SCREEN SERUM: NEGATIVE NG/ML
TROPONIN, HIGH SENSITIVITY: 28 NG/L (ref 0–11)
TROPONIN, HIGH SENSITIVITY: 28 NG/L (ref 0–11)
TROPONIN, HIGH SENSITIVITY: 43 NG/L (ref 0–11)
UROBILINOGEN UR STRIP-ACNC: 0.2 E.U./DL
WBC # BLD: 19 E9/L (ref 4.5–11.5)
WBC # BLD: 25.1 E9/L (ref 4.5–11.5)
WBC #/AREA URNS HPF: ABNORMAL /HPF (ref 0–5)

## 2023-04-16 PROCEDURE — 80179 DRUG ASSAY SALICYLATE: CPT

## 2023-04-16 PROCEDURE — 70450 CT HEAD/BRAIN W/O DYE: CPT

## 2023-04-16 PROCEDURE — 99285 EMERGENCY DEPT VISIT HI MDM: CPT

## 2023-04-16 PROCEDURE — 6360000002 HC RX W HCPCS: Performed by: EMERGENCY MEDICINE

## 2023-04-16 PROCEDURE — 6360000002 HC RX W HCPCS

## 2023-04-16 PROCEDURE — 71250 CT THORAX DX C-: CPT

## 2023-04-16 PROCEDURE — 96375 TX/PRO/DX INJ NEW DRUG ADDON: CPT

## 2023-04-16 PROCEDURE — 83605 ASSAY OF LACTIC ACID: CPT

## 2023-04-16 PROCEDURE — 80143 DRUG ASSAY ACETAMINOPHEN: CPT

## 2023-04-16 PROCEDURE — 82805 BLOOD GASES W/O2 SATURATION: CPT

## 2023-04-16 PROCEDURE — 2500000003 HC RX 250 WO HCPCS: Performed by: EMERGENCY MEDICINE

## 2023-04-16 PROCEDURE — 87040 BLOOD CULTURE FOR BACTERIA: CPT

## 2023-04-16 PROCEDURE — 93005 ELECTROCARDIOGRAM TRACING: CPT

## 2023-04-16 PROCEDURE — 96376 TX/PRO/DX INJ SAME DRUG ADON: CPT

## 2023-04-16 PROCEDURE — 80307 DRUG TEST PRSMV CHEM ANLYZR: CPT

## 2023-04-16 PROCEDURE — 82077 ASSAY SPEC XCP UR&BREATH IA: CPT

## 2023-04-16 PROCEDURE — 80053 COMPREHEN METABOLIC PANEL: CPT

## 2023-04-16 PROCEDURE — 2580000003 HC RX 258

## 2023-04-16 PROCEDURE — 71045 X-RAY EXAM CHEST 1 VIEW: CPT

## 2023-04-16 PROCEDURE — 84484 ASSAY OF TROPONIN QUANT: CPT

## 2023-04-16 PROCEDURE — 85025 COMPLETE CBC W/AUTO DIFF WBC: CPT

## 2023-04-16 PROCEDURE — 96365 THER/PROPH/DIAG IV INF INIT: CPT

## 2023-04-16 PROCEDURE — 2580000003 HC RX 258: Performed by: EMERGENCY MEDICINE

## 2023-04-16 PROCEDURE — 81001 URINALYSIS AUTO W/SCOPE: CPT

## 2023-04-16 PROCEDURE — 96367 TX/PROPH/DG ADDL SEQ IV INF: CPT

## 2023-04-16 PROCEDURE — 93010 ELECTROCARDIOGRAM REPORT: CPT | Performed by: INTERNAL MEDICINE

## 2023-04-16 PROCEDURE — 72125 CT NECK SPINE W/O DYE: CPT

## 2023-04-16 PROCEDURE — 82962 GLUCOSE BLOOD TEST: CPT

## 2023-04-16 RX ORDER — 0.9 % SODIUM CHLORIDE 0.9 %
1000 INTRAVENOUS SOLUTION INTRAVENOUS ONCE
Status: COMPLETED | OUTPATIENT
Start: 2023-04-16 | End: 2023-04-16

## 2023-04-16 RX ORDER — DOXYCYCLINE HYCLATE 100 MG
100 TABLET ORAL 2 TIMES DAILY
Qty: 14 TABLET | Refills: 0 | Status: SHIPPED | OUTPATIENT
Start: 2023-04-16 | End: 2023-04-23

## 2023-04-16 RX ORDER — 0.9 % SODIUM CHLORIDE 0.9 %
1000 INTRAVENOUS SOLUTION INTRAVENOUS ONCE
Status: COMPLETED | OUTPATIENT
Start: 2023-04-16 | End: 2023-04-17

## 2023-04-16 RX ORDER — CEFDINIR 300 MG/1
300 CAPSULE ORAL 2 TIMES DAILY
Qty: 14 CAPSULE | Refills: 0 | Status: SHIPPED | OUTPATIENT
Start: 2023-04-16 | End: 2023-04-23

## 2023-04-16 RX ORDER — ONDANSETRON 2 MG/ML
4 INJECTION INTRAMUSCULAR; INTRAVENOUS ONCE
Status: COMPLETED | OUTPATIENT
Start: 2023-04-16 | End: 2023-04-16

## 2023-04-16 RX ADMIN — SODIUM CHLORIDE 1000 ML: 9 INJECTION, SOLUTION INTRAVENOUS at 01:06

## 2023-04-16 RX ADMIN — WATER 1000 MG: 1 INJECTION INTRAMUSCULAR; INTRAVENOUS; SUBCUTANEOUS at 03:16

## 2023-04-16 RX ADMIN — ONDANSETRON 4 MG: 2 INJECTION INTRAMUSCULAR; INTRAVENOUS at 01:57

## 2023-04-16 RX ADMIN — SODIUM CHLORIDE 1000 ML: 9 INJECTION, SOLUTION INTRAVENOUS at 02:38

## 2023-04-16 RX ADMIN — ONDANSETRON 4 MG: 2 INJECTION INTRAMUSCULAR; INTRAVENOUS at 23:13

## 2023-04-16 RX ADMIN — DOXYCYCLINE 100 MG: 100 INJECTION, POWDER, LYOPHILIZED, FOR SOLUTION INTRAVENOUS at 03:15

## 2023-04-16 ASSESSMENT — PAIN - FUNCTIONAL ASSESSMENT
PAIN_FUNCTIONAL_ASSESSMENT: NONE - DENIES PAIN
PAIN_FUNCTIONAL_ASSESSMENT: WONG-BAKER FACES
PAIN_FUNCTIONAL_ASSESSMENT: NONE - DENIES PAIN

## 2023-04-17 VITALS
DIASTOLIC BLOOD PRESSURE: 76 MMHG | TEMPERATURE: 98.5 F | RESPIRATION RATE: 16 BRPM | OXYGEN SATURATION: 100 % | SYSTOLIC BLOOD PRESSURE: 111 MMHG | WEIGHT: 177 LBS | BODY MASS INDEX: 24.78 KG/M2 | HEIGHT: 71 IN

## 2023-04-17 PROBLEM — D72.829 LEUKOCYTOSIS: Status: ACTIVE | Noted: 2023-04-17

## 2023-04-17 PROBLEM — R77.8 ELEVATED TROPONIN: Status: ACTIVE | Noted: 2023-04-17

## 2023-04-17 PROBLEM — J18.9 PNEUMONIA DUE TO INFECTIOUS ORGANISM: Status: ACTIVE | Noted: 2023-04-17

## 2023-04-17 PROBLEM — F19.10 POLYSUBSTANCE ABUSE (HCC): Status: ACTIVE | Noted: 2023-04-17

## 2023-04-17 LAB — TROPONIN, HIGH SENSITIVITY: 46 NG/L (ref 0–11)

## 2023-04-17 PROCEDURE — 6360000002 HC RX W HCPCS

## 2023-04-17 PROCEDURE — 2580000003 HC RX 258

## 2023-04-17 RX ADMIN — SODIUM CHLORIDE 1000 ML: 9 INJECTION, SOLUTION INTRAVENOUS at 00:06

## 2023-04-17 RX ADMIN — PIPERACILLIN AND TAZOBACTAM 4500 MG: 4; .5 INJECTION, POWDER, LYOPHILIZED, FOR SOLUTION INTRAVENOUS; PARENTERAL at 00:15

## 2023-04-17 ASSESSMENT — PAIN - FUNCTIONAL ASSESSMENT: PAIN_FUNCTIONAL_ASSESSMENT: NONE - DENIES PAIN

## 2023-04-17 NOTE — ED NOTES
CO Sitter initiated at this time with sitter at bedside.       Rosalva Freedman RN  04/17/23 0007       Rosalva Freedman RN  04/17/23 0007

## 2023-04-17 NOTE — ED PROVIDER NOTES
tachycardic on initial examination. Regular rhythm. No murmurs, no gallops, no rubs. 2+ distal pulses. Equal extremity pulses  Chest: No chest wall tenderness  GI:  Abdomen Soft, Non tender, Non distended. No rebound, guarding, or rigidity  Musculoskeletal: Moves all extremities x 4. Warm and well perfused, no clubbing, no cyanosis, no edema. Capillary refill <3 seconds  Integument: skin warm and dry. No rashes   Neurologic: GCS 15, no focal deficits, symmetric strength 5/5 in the upper and lower extremities bilaterally  Psychiatric: Normal Affect    DIAGNOSTIC RESULTS   LABS:    Labs Reviewed   CBC WITH AUTO DIFFERENTIAL - Abnormal; Notable for the following components:       Result Value    WBC 19.0 (*)     Neutrophils % 85.0 (*)     Lymphocytes % 7.2 (*)     Neutrophils Absolute 16.12 (*)     Lymphocytes Absolute 1.36 (*)     Monocytes Absolute 1.11 (*)     All other components within normal limits   COMPREHENSIVE METABOLIC PANEL - Abnormal; Notable for the following components:    Glucose 146 (*)     Total Protein 6.1 (*)     AST 42 (*)     All other components within normal limits   TROPONIN - Abnormal; Notable for the following components:    Troponin, High Sensitivity 43 (*)     All other components within normal limits   TROPONIN - Abnormal; Notable for the following components:    Troponin, High Sensitivity 46 (*)     All other components within normal limits       As interpreted by me, the above displayed labs are abnormal. All other labs obtained during this visit were within normal range or not returned as of this dictation.     EKG Interpretation  Interpreted by emergency department resident physician, Usha Keane,   *Please see ED Course for EKG interpretation*    RADIOLOGY:   Non-plain film images such as CT, Ultrasound and MRI are read by the radiologist. Plain radiographic images are visualized and preliminarily interpreted by the ED Provider with the below findings:    My interpretation of

## 2023-04-17 NOTE — PLAN OF CARE
Patient seen in ED for admit to inpatient. Patient does not want to stay if he is not being forced/required to stay. Patient understands risks associated with diagnosis of aspiration pneumonia. Oriented times 4 at this time. Reports getting antibiotics filled yesterday that were prescribed in ED. On RA at this time and not requiring oxygen. Discussed plan of care with ED doctor.

## 2023-04-17 NOTE — ED NOTES
Patient's pink slipped revoked per providers orders. Physician spoke with patient and informed the reasoning behind this. CO sitter to be lifted at this time, ELLEN called and informed that sitter is no longer needed.       Mickey Spann RN  04/17/23 3433

## 2023-04-17 NOTE — ED NOTES
Patient attempted to leave facility and asked RN to take IV out. Providers states patient is pink slipped and unable to leave. RN explained to patient why they cannot leave facility,  also spoke with patient, pt complies. Then is walked to restroom to undress into a paper gown per protocol, patient then smoked a cigarette in the restroom and was educated by this RN and  that smoking is prohibited in premise. Pt complies, and is then moved to room 15 for CO. All belongings on patient are in a belongings bag with patient sticker and by the charge desk. Sitter is at bedside at [address]       Jeremy Garzon RN  04/17/23 1811

## 2023-04-18 LAB
EKG ATRIAL RATE: 86 BPM
EKG P AXIS: 80 DEGREES
EKG P-R INTERVAL: 168 MS
EKG Q-T INTERVAL: 368 MS
EKG QRS DURATION: 76 MS
EKG QTC CALCULATION (BAZETT): 440 MS
EKG R AXIS: 83 DEGREES
EKG T AXIS: 61 DEGREES
EKG VENTRICULAR RATE: 86 BPM

## 2023-04-18 PROCEDURE — 93010 ELECTROCARDIOGRAM REPORT: CPT | Performed by: INTERNAL MEDICINE

## 2023-04-21 LAB
BACTERIA BLD CULT ORG #2: NORMAL
BACTERIA BLD CULT: NORMAL